# Patient Record
Sex: FEMALE | Race: WHITE | Employment: OTHER | URBAN - NONMETROPOLITAN AREA
[De-identification: names, ages, dates, MRNs, and addresses within clinical notes are randomized per-mention and may not be internally consistent; named-entity substitution may affect disease eponyms.]

---

## 2020-11-11 ENCOUNTER — APPOINTMENT (OUTPATIENT)
Dept: GENERAL RADIOLOGY | Age: 85
DRG: 871 | End: 2020-11-11
Attending: HOSPITALIST
Payer: MEDICARE

## 2020-11-11 ENCOUNTER — OUTSIDE FACILITY SERVICE (OUTPATIENT)
Dept: PULMONOLOGY | Facility: CLINIC | Age: 85
End: 2020-11-11

## 2020-11-11 ENCOUNTER — APPOINTMENT (OUTPATIENT)
Dept: ULTRASOUND IMAGING | Age: 85
DRG: 871 | End: 2020-11-11
Attending: HOSPITALIST
Payer: MEDICARE

## 2020-11-11 ENCOUNTER — HOSPITAL ENCOUNTER (INPATIENT)
Age: 85
LOS: 9 days | Discharge: HOSPICE | DRG: 871 | End: 2020-11-20
Attending: HOSPITALIST | Admitting: HOSPITALIST
Payer: MEDICARE

## 2020-11-11 PROBLEM — J18.9 PNEUMONIA: Status: ACTIVE | Noted: 2020-11-11

## 2020-11-11 LAB
ALBUMIN SERPL-MCNC: 2.8 G/DL (ref 3.5–5.2)
ALP BLD-CCNC: 64 U/L (ref 35–104)
ALT SERPL-CCNC: 10 U/L (ref 5–33)
ANION GAP SERPL CALCULATED.3IONS-SCNC: 15 MMOL/L (ref 7–19)
APTT: 31.5 SEC (ref 26–36.2)
AST SERPL-CCNC: 19 U/L (ref 5–32)
BILIRUB SERPL-MCNC: 0.4 MG/DL (ref 0.2–1.2)
BUN BLDV-MCNC: 11 MG/DL (ref 8–23)
CALCIUM SERPL-MCNC: 9.1 MG/DL (ref 8.8–10.2)
CHLORIDE BLD-SCNC: 97 MMOL/L (ref 98–111)
CO2: 23 MMOL/L (ref 22–29)
CREAT SERPL-MCNC: 0.6 MG/DL (ref 0.5–0.9)
EKG P AXIS: 57 DEGREES
EKG P-R INTERVAL: 126 MS
EKG Q-T INTERVAL: 352 MS
EKG QRS DURATION: 80 MS
EKG QTC CALCULATION (BAZETT): 413 MS
EKG T AXIS: 65 DEGREES
GFR AFRICAN AMERICAN: >59
GFR NON-AFRICAN AMERICAN: >60
GLUCOSE BLD-MCNC: 170 MG/DL (ref 74–109)
GLUCOSE BLD-MCNC: 193 MG/DL (ref 70–99)
GLUCOSE BLD-MCNC: 247 MG/DL (ref 70–99)
GLUCOSE BLD-MCNC: 262 MG/DL (ref 70–99)
HCT VFR BLD CALC: 29.2 % (ref 37–47)
HEMOGLOBIN: 9.4 G/DL (ref 12–16)
INR BLD: 1.25 (ref 0.88–1.18)
LV EF: 60 %
LVEF MODALITY: NORMAL
MAGNESIUM: 1.6 MG/DL (ref 1.6–2.4)
MCH RBC QN AUTO: 28.9 PG (ref 27–31)
MCHC RBC AUTO-ENTMCNC: 32.2 G/DL (ref 33–37)
MCV RBC AUTO: 89.8 FL (ref 81–99)
PDW BLD-RTO: 13.1 % (ref 11.5–14.5)
PERFORMED ON: ABNORMAL
PLATELET # BLD: 383 K/UL (ref 130–400)
PMV BLD AUTO: 10 FL (ref 9.4–12.3)
POTASSIUM SERPL-SCNC: 3.7 MMOL/L (ref 3.5–5)
PRO-BNP: 862 PG/ML (ref 0–1800)
PROTHROMBIN TIME: 15.7 SEC (ref 12–14.6)
RBC # BLD: 3.25 M/UL (ref 4.2–5.4)
SODIUM BLD-SCNC: 135 MMOL/L (ref 136–145)
TOTAL PROTEIN: 6.2 G/DL (ref 6.6–8.7)
TROPONIN: <0.01 NG/ML (ref 0–0.03)
WBC # BLD: 14 K/UL (ref 4.8–10.8)

## 2020-11-11 PROCEDURE — 6360000002 HC RX W HCPCS: Performed by: HOSPITALIST

## 2020-11-11 PROCEDURE — 71045 X-RAY EXAM CHEST 1 VIEW: CPT

## 2020-11-11 PROCEDURE — 85027 COMPLETE CBC AUTOMATED: CPT

## 2020-11-11 PROCEDURE — 82947 ASSAY GLUCOSE BLOOD QUANT: CPT

## 2020-11-11 PROCEDURE — 94640 AIRWAY INHALATION TREATMENT: CPT

## 2020-11-11 PROCEDURE — C1729 CATH, DRAINAGE: HCPCS

## 2020-11-11 PROCEDURE — 87206 SMEAR FLUORESCENT/ACID STAI: CPT

## 2020-11-11 PROCEDURE — 2700000000 HC OXYGEN THERAPY PER DAY

## 2020-11-11 PROCEDURE — 85730 THROMBOPLASTIN TIME PARTIAL: CPT

## 2020-11-11 PROCEDURE — 83615 LACTATE (LD) (LDH) ENZYME: CPT

## 2020-11-11 PROCEDURE — 87150 DNA/RNA AMPLIFIED PROBE: CPT

## 2020-11-11 PROCEDURE — 85610 PROTHROMBIN TIME: CPT

## 2020-11-11 PROCEDURE — 93005 ELECTROCARDIOGRAM TRACING: CPT | Performed by: HOSPITALIST

## 2020-11-11 PROCEDURE — 99222 1ST HOSP IP/OBS MODERATE 55: CPT | Performed by: INTERNAL MEDICINE

## 2020-11-11 PROCEDURE — 89051 BODY FLUID CELL COUNT: CPT

## 2020-11-11 PROCEDURE — 36415 COLL VENOUS BLD VENIPUNCTURE: CPT

## 2020-11-11 PROCEDURE — 87075 CULTR BACTERIA EXCEPT BLOOD: CPT

## 2020-11-11 PROCEDURE — 0W9B3ZZ DRAINAGE OF LEFT PLEURAL CAVITY, PERCUTANEOUS APPROACH: ICD-10-PCS | Performed by: RADIOLOGY

## 2020-11-11 PROCEDURE — 87186 SC STD MICRODIL/AGAR DIL: CPT

## 2020-11-11 PROCEDURE — 87116 MYCOBACTERIA CULTURE: CPT

## 2020-11-11 PROCEDURE — 93306 TTE W/DOPPLER COMPLETE: CPT

## 2020-11-11 PROCEDURE — 83880 ASSAY OF NATRIURETIC PEPTIDE: CPT

## 2020-11-11 PROCEDURE — 87070 CULTURE OTHR SPECIMN AEROBIC: CPT

## 2020-11-11 PROCEDURE — 1210000000 HC MED SURG R&B

## 2020-11-11 PROCEDURE — 87102 FUNGUS ISOLATION CULTURE: CPT

## 2020-11-11 PROCEDURE — 84157 ASSAY OF PROTEIN OTHER: CPT

## 2020-11-11 PROCEDURE — 2580000003 HC RX 258: Performed by: HOSPITALIST

## 2020-11-11 PROCEDURE — 93010 ELECTROCARDIOGRAM REPORT: CPT | Performed by: INTERNAL MEDICINE

## 2020-11-11 PROCEDURE — 86738 MYCOPLASMA ANTIBODY: CPT

## 2020-11-11 PROCEDURE — 80053 COMPREHEN METABOLIC PANEL: CPT

## 2020-11-11 PROCEDURE — 87015 SPECIMEN INFECT AGNT CONCNTJ: CPT

## 2020-11-11 PROCEDURE — 6370000000 HC RX 637 (ALT 250 FOR IP): Performed by: HOSPITALIST

## 2020-11-11 PROCEDURE — 87205 SMEAR GRAM STAIN: CPT

## 2020-11-11 PROCEDURE — 84484 ASSAY OF TROPONIN QUANT: CPT

## 2020-11-11 PROCEDURE — 82945 GLUCOSE OTHER FLUID: CPT

## 2020-11-11 PROCEDURE — B246ZZZ ULTRASONOGRAPHY OF RIGHT AND LEFT HEART: ICD-10-PCS | Performed by: INTERNAL MEDICINE

## 2020-11-11 PROCEDURE — 83735 ASSAY OF MAGNESIUM: CPT

## 2020-11-11 PROCEDURE — 87040 BLOOD CULTURE FOR BACTERIA: CPT

## 2020-11-11 RX ORDER — GLIMEPIRIDE 4 MG/1
4 TABLET ORAL
Status: ON HOLD | COMMUNITY
End: 2020-11-20 | Stop reason: HOSPADM

## 2020-11-11 RX ORDER — MV-MN/OM3/DHA/EPA/FISH/LUT/ZEA 250-5-1 MG
CAPSULE ORAL
Status: ON HOLD | COMMUNITY
End: 2020-11-20 | Stop reason: HOSPADM

## 2020-11-11 RX ORDER — SODIUM CHLORIDE 9 MG/ML
INJECTION, SOLUTION INTRAVENOUS CONTINUOUS
Status: DISCONTINUED | OUTPATIENT
Start: 2020-11-11 | End: 2020-11-20 | Stop reason: HOSPADM

## 2020-11-11 RX ORDER — ACETAMINOPHEN 160 MG
TABLET,DISINTEGRATING ORAL
Status: ON HOLD | COMMUNITY
End: 2020-11-20 | Stop reason: HOSPADM

## 2020-11-11 RX ORDER — ASPIRIN 81 MG/1
81 TABLET ORAL DAILY
Status: ON HOLD | COMMUNITY
End: 2020-11-20 | Stop reason: HOSPADM

## 2020-11-11 RX ORDER — LOVASTATIN 10 MG/1
10 TABLET ORAL NIGHTLY
Status: ON HOLD | COMMUNITY
End: 2020-11-20 | Stop reason: HOSPADM

## 2020-11-11 RX ORDER — SODIUM CHLORIDE 0.9 % (FLUSH) 0.9 %
10 SYRINGE (ML) INJECTION EVERY 12 HOURS SCHEDULED
Status: DISCONTINUED | OUTPATIENT
Start: 2020-11-11 | End: 2020-11-20 | Stop reason: HOSPADM

## 2020-11-11 RX ORDER — ONDANSETRON 2 MG/ML
4 INJECTION INTRAMUSCULAR; INTRAVENOUS EVERY 6 HOURS PRN
Status: DISCONTINUED | OUTPATIENT
Start: 2020-11-11 | End: 2020-11-20 | Stop reason: HOSPADM

## 2020-11-11 RX ORDER — NICOTINE POLACRILEX 4 MG
15 LOZENGE BUCCAL PRN
Status: DISCONTINUED | OUTPATIENT
Start: 2020-11-11 | End: 2020-11-20 | Stop reason: HOSPADM

## 2020-11-11 RX ORDER — FAMOTIDINE 20 MG/1
20 TABLET, FILM COATED ORAL 2 TIMES DAILY
Status: DISCONTINUED | OUTPATIENT
Start: 2020-11-11 | End: 2020-11-11

## 2020-11-11 RX ORDER — SODIUM CHLORIDE 0.9 % (FLUSH) 0.9 %
10 SYRINGE (ML) INJECTION PRN
Status: DISCONTINUED | OUTPATIENT
Start: 2020-11-11 | End: 2020-11-20 | Stop reason: HOSPADM

## 2020-11-11 RX ORDER — SODIUM CHLORIDE 0.9 % (FLUSH) 0.9 %
10 SYRINGE (ML) INJECTION EVERY 12 HOURS SCHEDULED
Status: DISCONTINUED | OUTPATIENT
Start: 2020-11-11 | End: 2020-11-11 | Stop reason: SDUPTHER

## 2020-11-11 RX ORDER — LISINOPRIL 10 MG/1
10 TABLET ORAL DAILY
Status: ON HOLD | COMMUNITY
End: 2020-11-20 | Stop reason: HOSPADM

## 2020-11-11 RX ORDER — SODIUM CHLORIDE 0.9 % (FLUSH) 0.9 %
10 SYRINGE (ML) INJECTION PRN
Status: DISCONTINUED | OUTPATIENT
Start: 2020-11-11 | End: 2020-11-11 | Stop reason: SDUPTHER

## 2020-11-11 RX ORDER — PROMETHAZINE HYDROCHLORIDE 12.5 MG/1
12.5 TABLET ORAL EVERY 6 HOURS PRN
Status: DISCONTINUED | OUTPATIENT
Start: 2020-11-11 | End: 2020-11-20 | Stop reason: HOSPADM

## 2020-11-11 RX ORDER — POLYETHYLENE GLYCOL 3350 17 G/17G
17 POWDER, FOR SOLUTION ORAL DAILY PRN
Status: DISCONTINUED | OUTPATIENT
Start: 2020-11-11 | End: 2020-11-20 | Stop reason: HOSPADM

## 2020-11-11 RX ORDER — PROMETHAZINE HYDROCHLORIDE 12.5 MG/1
12.5 TABLET ORAL EVERY 6 HOURS PRN
Status: DISCONTINUED | OUTPATIENT
Start: 2020-11-11 | End: 2020-11-11

## 2020-11-11 RX ORDER — IPRATROPIUM BROMIDE AND ALBUTEROL SULFATE 2.5; .5 MG/3ML; MG/3ML
1 SOLUTION RESPIRATORY (INHALATION)
Status: DISCONTINUED | OUTPATIENT
Start: 2020-11-11 | End: 2020-11-20 | Stop reason: HOSPADM

## 2020-11-11 RX ORDER — DEXTROSE MONOHYDRATE 50 MG/ML
100 INJECTION, SOLUTION INTRAVENOUS PRN
Status: DISCONTINUED | OUTPATIENT
Start: 2020-11-11 | End: 2020-11-20 | Stop reason: HOSPADM

## 2020-11-11 RX ORDER — ACETAMINOPHEN 325 MG/1
650 TABLET ORAL EVERY 6 HOURS PRN
Status: DISCONTINUED | OUTPATIENT
Start: 2020-11-11 | End: 2020-11-11 | Stop reason: SDUPTHER

## 2020-11-11 RX ORDER — POLYETHYLENE GLYCOL 3350 17 G/17G
17 POWDER, FOR SOLUTION ORAL DAILY PRN
Status: DISCONTINUED | OUTPATIENT
Start: 2020-11-11 | End: 2020-11-11

## 2020-11-11 RX ORDER — ACETAMINOPHEN 650 MG/1
650 SUPPOSITORY RECTAL EVERY 6 HOURS PRN
Status: DISCONTINUED | OUTPATIENT
Start: 2020-11-11 | End: 2020-11-20 | Stop reason: HOSPADM

## 2020-11-11 RX ORDER — ACETAMINOPHEN 325 MG/1
650 TABLET ORAL EVERY 6 HOURS PRN
Status: DISCONTINUED | OUTPATIENT
Start: 2020-11-11 | End: 2020-11-20 | Stop reason: HOSPADM

## 2020-11-11 RX ORDER — LATANOPROST 50 UG/ML
1 SOLUTION/ DROPS OPHTHALMIC NIGHTLY
Status: ON HOLD | COMMUNITY
End: 2020-11-20 | Stop reason: HOSPADM

## 2020-11-11 RX ORDER — LEVOFLOXACIN 5 MG/ML
750 INJECTION, SOLUTION INTRAVENOUS EVERY 24 HOURS
Status: DISCONTINUED | OUTPATIENT
Start: 2020-11-11 | End: 2020-11-14

## 2020-11-11 RX ORDER — ONDANSETRON 2 MG/ML
4 INJECTION INTRAMUSCULAR; INTRAVENOUS EVERY 6 HOURS PRN
Status: DISCONTINUED | OUTPATIENT
Start: 2020-11-11 | End: 2020-11-11

## 2020-11-11 RX ORDER — DEXTROSE MONOHYDRATE 25 G/50ML
12.5 INJECTION, SOLUTION INTRAVENOUS PRN
Status: DISCONTINUED | OUTPATIENT
Start: 2020-11-11 | End: 2020-11-20 | Stop reason: HOSPADM

## 2020-11-11 RX ORDER — ACETAMINOPHEN 650 MG/1
650 SUPPOSITORY RECTAL EVERY 6 HOURS PRN
Status: DISCONTINUED | OUTPATIENT
Start: 2020-11-11 | End: 2020-11-11 | Stop reason: SDUPTHER

## 2020-11-11 RX ADMIN — SODIUM CHLORIDE: 9 INJECTION, SOLUTION INTRAVENOUS at 08:00

## 2020-11-11 RX ADMIN — ACETAMINOPHEN 650 MG: 325 TABLET ORAL at 19:48

## 2020-11-11 RX ADMIN — PIPERACILLIN AND TAZOBACTAM 3.38 G: 3; .375 INJECTION, POWDER, LYOPHILIZED, FOR SOLUTION INTRAVENOUS at 10:35

## 2020-11-11 RX ADMIN — IPRATROPIUM BROMIDE AND ALBUTEROL SULFATE 1 AMPULE: .5; 3 SOLUTION RESPIRATORY (INHALATION) at 11:33

## 2020-11-11 RX ADMIN — VANCOMYCIN HYDROCHLORIDE 750 MG: 1 INJECTION, POWDER, LYOPHILIZED, FOR SOLUTION INTRAVENOUS at 11:50

## 2020-11-11 RX ADMIN — VANCOMYCIN HYDROCHLORIDE 1000 MG: 10 INJECTION, POWDER, LYOPHILIZED, FOR SOLUTION INTRAVENOUS at 11:25

## 2020-11-11 RX ADMIN — IPRATROPIUM BROMIDE AND ALBUTEROL SULFATE 1 AMPULE: .5; 3 SOLUTION RESPIRATORY (INHALATION) at 07:39

## 2020-11-11 RX ADMIN — PIPERACILLIN AND TAZOBACTAM 3.38 G: 3; .375 INJECTION, POWDER, LYOPHILIZED, FOR SOLUTION INTRAVENOUS at 19:48

## 2020-11-11 RX ADMIN — LEVOFLOXACIN 750 MG: 5 INJECTION, SOLUTION INTRAVENOUS at 08:00

## 2020-11-11 SDOH — HEALTH STABILITY: MENTAL HEALTH: HOW OFTEN DO YOU HAVE A DRINK CONTAINING ALCOHOL?: NEVER

## 2020-11-11 ASSESSMENT — PAIN SCALES - GENERAL: PAINLEVEL_OUTOF10: 7

## 2020-11-11 NOTE — PROGRESS NOTES
4 Eyes Skin Assessment    Neil Choi is being assessed upon: Admission    I agree that I, Ashley Kehr, along with Cale Peguero RN (either 2 RN's or 1 LPN and 1 RN) have performed a thorough Head to Toe Skin Assessment on the patient. ALL assessment sites listed below have been assessed. Areas assessed by both nurses:     [x]   Head, Face, and Ears   [x]   Shoulders, Back, and Chest  [x]   Arms, Elbows, and Hands   [x]   Coccyx, Sacrum, and Ischium  [x]   Legs, Feet, and Heels    Does the Patient have Skin Breakdown?  No    Mark Prevention initiated: No  Wound Care Orders initiated: No    WOC nurse consulted for Pressure Injury (Stage 3,4, Unstageable, DTI, NWPT, and Complex wounds) and New or Established Ostomies: No        Primary Nurse eSignature: Ashley Kehr, RN on 11/11/2020 at 6:00 AM      Co-Signer eSignature: Electronically signed by Robert Klein RN on 11/11/20 at 6:40 AM CST

## 2020-11-11 NOTE — PROGRESS NOTES
Pharmacy Note  Vancomycin Consult    Panchito Perdomo is a 80 y.o. female started on Vancomycin for pneumonia; consult received from Dr. Meena Alex to manage therapy. Also receiving the following antibiotics: piperacillin-tazobactam, Levofloxacin. Active Problems:    Pneumonia  Resolved Problems:    * No resolved hospital problems. *      Allergies:  Erythromycin; Niacin and related; Penicillins; and Sulfa antibiotics     Temp max: 98.2    Recent Labs     11/11/20  0741   BUN 11       Recent Labs     11/11/20  0741   CREATININE 0.6       Recent Labs     11/11/20  0741   WBC 14.0*       No intake or output data in the 24 hours ending 11/11/20 1318    Culture Date Source Results   11/11/20 Blood Sent       Ht Readings from Last 1 Encounters:   11/11/20 5' 7\" (1.702 m)        Wt Readings from Last 1 Encounters:   11/11/20 122 lb 9 oz (55.6 kg)         Body mass index is 19.2 kg/m². Estimated Creatinine Clearance: 56 mL/min (based on SCr of 0.6 mg/dL). Assessment/Plan:  Will initiate vancomycin 1000 mg IV every 24 hours. Timing of trough level will be determined based on culture results, renal function, and clinical response. Thank you for the consult. Will continue to follow.     Electronically signed by Jeremy Bauer, [facility] on 11/11/2020 at 1:18 PM

## 2020-11-11 NOTE — PROGRESS NOTES
Speech Language Pathology   Facility/Department: [facility] 3 SHELLY/VAS/MED    NAME: Ekaterina Flores  : 1931  MRN: [id number]     Received order to assess. Patient currently NPO for thoracentesis.  Will complete evaluation when cleared for PO intake by MD.    Electronically signed by MALISSA Heath on 2020 at 12:58 PM

## 2020-11-11 NOTE — SIGNIFICANT EVENT
Patient seen and examined. States she is feeling well, denies any complaints. Thoracentesis pending. Reviewed records from OSH and updated allergies (Patient does not know what she is allergic to). Penicillin is listed as allergy but she tolerated piperacillin/tazobactam without issue.

## 2020-11-11 NOTE — H&P
History and Physical    Patient Name:  Arleth Burnner    :  1931    Chief Complaint:   Large plural effusion     History of Present Illness:   Arleth Brunner presents to [facility] as transfer with large left plural effusion, possible PNA. Started on Azithromycin and Rocephin. ER was not able to drain the effusion. US guided thoracentesis is ordered for today am. Pulmonary service consulted. Labs pending    Pt is demented and not answering any questions    Past Medical History:   has a past medical history of Blood circulation, collateral, Cerebral artery occlusion with cerebral infarction ([facility] [address] Diabetes mellitus ([facility] [address] Hyperlipidemia, and Hypertension. Surgical History:   has a past surgical history that includes Cholecystectomy. Social History:   reports that she does not drink alcohol. Family History:  unknown    Medications:  Prior to Admission medications    Medication Sig Start Date End Date Taking? Authorizing Provider   aspirin EC 81 MG EC tablet Take 81 mg by mouth daily   Yes Historical Provider, MD   Multiple Vitamins-Minerals ([address] 50+) CAPS Take by mouth   Yes Historical Provider, MD   Cholecalciferol (VITAMIN D3) 50 MCG ( UT) CAPS Take by mouth   Yes Historical Provider, MD   glimepiride (AMARYL) 4 MG tablet Take 4 mg by mouth every morning (before breakfast)   Yes Historical Provider, MD   latanoprost (XALATAN) 0.005 % ophthalmic solution 1 drop nightly   Yes Historical Provider, MD   lisinopril (PRINIVIL;ZESTRIL) 10 MG tablet Take 10 mg by mouth daily   Yes Historical Provider, MD   lovastatin (MEVACOR) 10 MG tablet Take 10 mg by mouth nightly   Yes Historical Provider, MD   metFORMIN (GLUCOPHAGE) 1000 MG tablet Take 1,000 mg by mouth 2 times daily (with meals)   Yes Historical Provider, MD       Allergies:  Patient has no allergy information on record.      Review of Systems:   Pt demented, not answering any questions    Physical Examination:    Vital Signs: BP (!) 142/68   Pulse 99   Temp 98.2 °F (36.8 °C) (Temporal)   Resp 16   Wt 122 lb 9 oz (55.6 kg)   SpO2 94%   General appearance: Well preserved, mesomorphic body habitus, confused, moderate distress. Skin: Skin color, texture, turgor normal. No rashes or lesions. No induration or tightening palpated. Head: Normocephalic. No masses, lesions, tenderness or abnormalities  Eyes: conjunctivae/corneas clear. EOM's intact. Sclera non icteric. Ears: External ears normal.  Nose/Sinuses: Nares normal. Septum midline. Mucosa normal. No drainage or sinus tenderness. Oropharynx: Lips, mucosa, and tongue normal. Oropharynx clear with no exudate seen. Neck: Neck supple, and symmetric. No adenopathy. Trachea is midline. Carotids brisk in upstroke without bruits, No abnormal JVP noted at 45°. Lungs: decreased breath sounds BL  Heart:  S1 > S2. Regular rate and rhythm. No gallop, murmur, rub, palpable thrill or heave noted. Abdomen: Abdomen soft, non-tender. BS normal. No masses, organomegaly. No hernia noted. Extremities: Extremities normal. No deformities, edema, or skin discoloration. No cyanosis or clubbing noted to the nails. Peripheral pulses 4/4. Musculoskeletal: Spine ROM normal. Muscular strength intact. Neuro: does not follow any commends. Pertinent Labs:  CBC: No results for input(s): WBC, RBC, HGB, HCT, MCV, MCH, MCHC, RDW, PLT, MPV in the last 72 hours. BMP: No results for input(s): NA, K, CL, CO2, BUN, CREATININE, GLUCOSE, CALCIUM in the last 72 hours. ABGs: No results for input(s): PO2, PCO2, PH, HCO3, BE, O2SAT in the last 72 hours. INR: No results for input(s): INR, PROTIME in the last 72 hours.   BNP:  No results found for: BNP  TSH: No results found for: TSH  Cardiac Injury Profile: No results found for: CKTOTAL, CKMB, CKMBINDEX, TROPONINI  Lipid Profile: No components found for: CHLPL  No results found for: TRIG  No results found for: HDL  No results found for: LDLCALC  No results found for: LABVLDL  Hemoglobin A1C: No results found for: LABA1C  No results found for: EAG      Assessment/Plan:  · Pleural effusion - thoracentesis today- pulmonary consult  · Possible PNA- blood cultures, ab  · DM2- iss                   I have reviewed my findings and recommendations in detail with Sera Meyers.     Pamela Jolly MD       Admission level 2

## 2020-11-12 ENCOUNTER — OUTSIDE FACILITY SERVICE (OUTPATIENT)
Dept: PULMONOLOGY | Facility: CLINIC | Age: 85
End: 2020-11-12

## 2020-11-12 PROBLEM — Z51.5 PALLIATIVE CARE PATIENT: Status: ACTIVE | Noted: 2020-11-12

## 2020-11-12 PROBLEM — D64.9 NORMOCYTIC ANEMIA: Status: ACTIVE | Noted: 2020-11-12

## 2020-11-12 PROBLEM — E11.9 TYPE 2 DIABETES MELLITUS (HCC): Status: ACTIVE | Noted: 2020-11-12

## 2020-11-12 PROBLEM — I69.30 HISTORY OF STROKE WITH RESIDUAL DEFICIT: Status: ACTIVE | Noted: 2020-11-12

## 2020-11-12 PROBLEM — F03.90 DEMENTIA (HCC): Status: ACTIVE | Noted: 2020-11-12

## 2020-11-12 PROBLEM — J90 PLEURAL EFFUSION: Status: ACTIVE | Noted: 2020-11-12

## 2020-11-12 PROBLEM — I10 ESSENTIAL HYPERTENSION: Status: ACTIVE | Noted: 2020-11-12

## 2020-11-12 PROBLEM — A41.9 SEPSIS (HCC): Status: ACTIVE | Noted: 2020-11-12

## 2020-11-12 PROBLEM — E83.51 HYPOCALCEMIA: Status: ACTIVE | Noted: 2020-11-12

## 2020-11-12 PROBLEM — E87.1 HYPONATREMIA: Status: ACTIVE | Noted: 2020-11-12

## 2020-11-12 LAB
ACINETOBACTER BAUMANNII BY PCR: NOT DETECTED
ANION GAP SERPL CALCULATED.3IONS-SCNC: 10 MMOL/L (ref 7–19)
APPEARANCE FLUID: NORMAL
BUN BLDV-MCNC: 10 MG/DL (ref 8–23)
CALCIUM SERPL-MCNC: 8.6 MG/DL (ref 8.8–10.2)
CANDIDA ALBICANS BY PCR: NOT DETECTED
CANDIDA GLABRATA BY PCR: NOT DETECTED
CANDIDA KRUSEI BY PCR: NOT DETECTED
CANDIDA PARAPSILOSIS BY PCR: NOT DETECTED
CANDIDA TROPICALIS BY PCR: NOT DETECTED
CELL COUNT FLUID TYPE: NORMAL
CHLORIDE BLD-SCNC: 99 MMOL/L (ref 98–111)
CLOT EVALUATION: NORMAL
CO2: 24 MMOL/L (ref 22–29)
COLOR FLUID: NORMAL
CREAT SERPL-MCNC: 0.5 MG/DL (ref 0.5–0.9)
ENTEROBACTER CLOACAE COMPLEX BY PCR: NOT DETECTED
ENTEROBACTERALES BY PCR: NOT DETECTED
ENTEROCOCCUS BY PCR: NOT DETECTED
ESCHERICHIA COLI BY PCR: NOT DETECTED
FLUID TYPE: NORMAL
GFR AFRICAN AMERICAN: >59
GFR NON-AFRICAN AMERICAN: >60
GLUCOSE BLD-MCNC: 157 MG/DL (ref 74–109)
GLUCOSE BLD-MCNC: 263 MG/DL (ref 70–99)
GLUCOSE BLD-MCNC: 291 MG/DL (ref 70–99)
GLUCOSE BLD-MCNC: 402 MG/DL (ref 70–99)
GLUCOSE, FLUID: 191
HAEMOPHILUS INFLUENZAE BY PCR: NOT DETECTED
HCT VFR BLD CALC: 27.8 % (ref 37–47)
HEMOGLOBIN: 9 G/DL (ref 12–16)
KLEBSIELLA OXYTOCA BY PCR: NOT DETECTED
KLEBSIELLA PNEUMONIAE GROUP BY PCR: NOT DETECTED
LD, FLUID: 766 U/L
LISTERIA MONOCYTOGENES BY PCR: NOT DETECTED
LYMPHOCYTES, BODY FLUID: 94 %
MACROPHAGE FLUID: 3 %
MCH RBC QN AUTO: 28.7 PG (ref 27–31)
MCHC RBC AUTO-ENTMCNC: 32.4 G/DL (ref 33–37)
MCV RBC AUTO: 88.5 FL (ref 81–99)
METHICILLIN RESISTANCE MECA/C  BY PCR: NOT DETECTED
MONOCYTE, FLUID: 2 %
NEISSERIA MENINGITIDIS BY PCR: NOT DETECTED
NEUTROPHIL, FLUID: 1 %
NUCLEATED CELLS FLUID: 877 /CUMM
NUMBER OF CELLS COUNTED FLUID: 100
PDW BLD-RTO: 13.2 % (ref 11.5–14.5)
PERFORMED ON: ABNORMAL
PLATELET # BLD: 357 K/UL (ref 130–400)
PMV BLD AUTO: 9.8 FL (ref 9.4–12.3)
POTASSIUM REFLEX MAGNESIUM: 3.9 MMOL/L (ref 3.5–5)
PROTEIN FLUID: 3.5 G/DL
PROTEUS BY PCR: NOT DETECTED
PSEUDOMONAS AERUGINOSA BY PCR: NOT DETECTED
RBC # BLD: 3.14 M/UL (ref 4.2–5.4)
RBC FLUID: NORMAL /CUMM
SARS-COV-2, NAAT: NOT DETECTED
SERRATIA MARCESCENS BY PCR: NOT DETECTED
SODIUM BLD-SCNC: 133 MMOL/L (ref 136–145)
STAPHYLOCOCCUS AUREUS BY PCR: NOT DETECTED
STAPHYLOCOCCUS SPECIES BY PCR: DETECTED
STREPTOCOCCUS AGALACTIAE BY PCR: NOT DETECTED
STREPTOCOCCUS PNEUMONIAE BY PCR: NOT DETECTED
STREPTOCOCCUS PYOGENES  BY PCR: NOT DETECTED
STREPTOCOCCUS SPECIES BY PCR: NOT DETECTED
WBC # BLD: 12.8 K/UL (ref 4.8–10.8)

## 2020-11-12 PROCEDURE — 92610 EVALUATE SWALLOWING FUNCTION: CPT

## 2020-11-12 PROCEDURE — 6370000000 HC RX 637 (ALT 250 FOR IP): Performed by: HOSPITALIST

## 2020-11-12 PROCEDURE — 2580000003 HC RX 258: Performed by: HOSPITALIST

## 2020-11-12 PROCEDURE — 92523 SPEECH SOUND LANG COMPREHEN: CPT

## 2020-11-12 PROCEDURE — 1210000000 HC MED SURG R&B

## 2020-11-12 PROCEDURE — 99232 SBSQ HOSP IP/OBS MODERATE 35: CPT | Performed by: INTERNAL MEDICINE

## 2020-11-12 PROCEDURE — 36415 COLL VENOUS BLD VENIPUNCTURE: CPT

## 2020-11-12 PROCEDURE — 6360000002 HC RX W HCPCS: Performed by: HOSPITALIST

## 2020-11-12 PROCEDURE — 80048 BASIC METABOLIC PNL TOTAL CA: CPT

## 2020-11-12 PROCEDURE — 82947 ASSAY GLUCOSE BLOOD QUANT: CPT

## 2020-11-12 PROCEDURE — 88305 TISSUE EXAM BY PATHOLOGIST: CPT

## 2020-11-12 PROCEDURE — 88112 CYTOPATH CELL ENHANCE TECH: CPT

## 2020-11-12 PROCEDURE — 94640 AIRWAY INHALATION TREATMENT: CPT

## 2020-11-12 PROCEDURE — U0002 COVID-19 LAB TEST NON-CDC: HCPCS

## 2020-11-12 PROCEDURE — 85027 COMPLETE CBC AUTOMATED: CPT

## 2020-11-12 PROCEDURE — 2700000000 HC OXYGEN THERAPY PER DAY

## 2020-11-12 RX ADMIN — PIPERACILLIN AND TAZOBACTAM 3.38 G: 3; .375 INJECTION, POWDER, LYOPHILIZED, FOR SOLUTION INTRAVENOUS at 20:13

## 2020-11-12 RX ADMIN — Medication 1000 MG: at 10:13

## 2020-11-12 RX ADMIN — Medication 10 ML: at 01:16

## 2020-11-12 RX ADMIN — PIPERACILLIN AND TAZOBACTAM 3.38 G: 3; .375 INJECTION, POWDER, LYOPHILIZED, FOR SOLUTION INTRAVENOUS at 11:43

## 2020-11-12 RX ADMIN — LEVOFLOXACIN 750 MG: 5 INJECTION, SOLUTION INTRAVENOUS at 08:33

## 2020-11-12 RX ADMIN — INSULIN LISPRO 6 UNITS: 100 INJECTION, SOLUTION INTRAVENOUS; SUBCUTANEOUS at 16:55

## 2020-11-12 RX ADMIN — INSULIN LISPRO 2 UNITS: 100 INJECTION, SOLUTION INTRAVENOUS; SUBCUTANEOUS at 23:01

## 2020-11-12 RX ADMIN — INSULIN LISPRO 3 UNITS: 100 INJECTION, SOLUTION INTRAVENOUS; SUBCUTANEOUS at 12:59

## 2020-11-12 RX ADMIN — Medication 10 ML: at 09:07

## 2020-11-12 RX ADMIN — IPRATROPIUM BROMIDE AND ALBUTEROL SULFATE 1 AMPULE: .5; 3 SOLUTION RESPIRATORY (INHALATION) at 10:34

## 2020-11-12 RX ADMIN — IPRATROPIUM BROMIDE AND ALBUTEROL SULFATE 1 AMPULE: .5; 3 SOLUTION RESPIRATORY (INHALATION) at 20:41

## 2020-11-12 RX ADMIN — ONDANSETRON 4 MG: 2 INJECTION INTRAMUSCULAR; INTRAVENOUS at 10:53

## 2020-11-12 RX ADMIN — IPRATROPIUM BROMIDE AND ALBUTEROL SULFATE 1 AMPULE: .5; 3 SOLUTION RESPIRATORY (INHALATION) at 07:04

## 2020-11-12 RX ADMIN — PIPERACILLIN AND TAZOBACTAM 3.38 G: 3; .375 INJECTION, POWDER, LYOPHILIZED, FOR SOLUTION INTRAVENOUS at 04:47

## 2020-11-12 NOTE — PROGRESS NOTES
Hospitalist Progress Note  11/12/2020 3:29 PM  Subjective:   Admit Date: 11/11/2020  PCP: Milan Carl    Chief Complaint: shortness of breath, pleural effusion    Subjective: Patient remains quite confused this morning though she has some recollection of undergoing thoracentesis yesterday. She seems able to breath a little more easily today. No bleeding or chest pain. History is otherwise unchanged. Cumulative Hospital History:   11-11: Presented overnight from OSH ED where she was taken with shortness of breath. Large pleural effusion on left discovered and they were unable to perform thoracentesis in ED and thus was transferred for this and for pulmonology consult. Patient demented, unable to provide history. Admitted to med/surg on levofloxacin, piperacillin/tazobactam, and vancomycin. Underwent US-guided thoracentesis. Pulmonology consulted. 11-12: Still very confused but seems to be breathing more easily now. Will need to contact family to determine her baseline mental status. ROS: 14 point review of systems is negative except as specifically addressed above. DIET DYSPHAGIA PUREED;  Moderately Thick (Honey)    Intake/Output Summary (Last 24 hours) at 11/12/2020 1529  Last data filed at 11/12/2020 1428  Gross per 24 hour   Intake 480 ml   Output --   Net 480 ml     Medications:   sodium chloride 75 mL/hr at 11/11/20 0800    dextrose       Current Facility-Administered Medications   Medication Dose Route Frequency Provider Last Rate Last Dose    0.9 % sodium chloride infusion   Intravenous Continuous Katiana Membreno MD 75 mL/hr at 11/11/20 0800      sodium chloride flush 0.9 % injection 10 mL  10 mL Intravenous 2 times per day Katiana Membreno MD   10 mL at 11/12/20 0907    sodium chloride flush 0.9 % injection 10 mL  10 mL Intravenous PRN Katiana Membreno MD        acetaminophen (TYLENOL) tablet 650 mg  650 mg Oral Q6H PRN Katiana Membreno MD   650 mg at 11/11/20 1948 Or    acetaminophen (TYLENOL) suppository 650 mg  650 mg Rectal Q6H PRN Tyrone Anderson MD        polyethylene glycol [facility]) packet 17 g  17 g Oral Daily PRN Tyrone Anderson MD        promethazine (PHENERGAN) tablet 12.5 mg  12.5 mg Oral Q6H PRN Tyrone Anderson MD        Or    ondansetron [facility]) injection 4 mg  4 mg Intravenous Q6H PRN Tyrone Anderson MD   4 mg at 11/12/20 1053    ipratropium-albuterol (DUONEB) nebulizer solution 1 ampule  1 ampule Inhalation Q4H WA Tyrone Anderson MD   1 ampule at 11/12/20 1034    piperacillin-tazobactam (ZOSYN) 3.375 g in dextrose 5 % 50 mL IVPB extended infusion (mini-bag)  3.375 g Intravenous Q8H Luca Toro MD 12.5 mL/hr at 11/12/20 1143 3.375 g at 11/12/20 1143    And    levoFLOXacin (LEVAQUIN) 750 MG/150ML infusion 750 mg  750 mg Intravenous Q24H Tyrone Anderson MD   Stopped at 11/12/20 1003    insulin lispro (HUMALOG) injection vial 0-6 Units  0-6 Units Subcutaneous TID WC Tyrone Anderson MD   3 Units at 11/12/20 1259    insulin lispro (HUMALOG) injection vial 0-3 Units  0-3 Units Subcutaneous Nightly Tyrone Anderson MD        glucose (GLUTOSE) 40 % oral gel 15 g  15 g Oral PRN Tyrone Anderson MD        dextrose 50 % IV solution  12.5 g Intravenous PRN Tyrone Anderson MD        glucagon (rDNA) injection 1 mg  1 mg Intramuscular PRN Tyrone Anderson MD        dextrose 5 % solution  100 mL/hr Intravenous PRN Tyrone Anderson MD        vancomycin Eileen Cortes) intermittent dosing (placeholder)   Other RX Placeholder Tyrone Anderson MD        vancomycin (VANCOCIN) 1 g in dextrose 5% 250 mL IVPB  1,000 mg Intravenous Q24H Tyrone Anderson MD   Stopped at 11/12/20 1113        Labs:     Recent Labs     11/11/20  0741 11/12/20  0316   WBC 14.0* 12.8*   RBC 3.25* 3.14*   HGB 9.4* 9.0*   HCT 29.2* 27.8*   MCV 89.8 88.5   MCH 28.9 28.7   MCHC 32.2* 32.4*    357     Recent Labs     11/11/20  0741 11/12/20  0316   * 133*   K 3.7 3.9   ANIONGAP 15 10   CL 97* 99   CO2 23 24   BUN 11 10   CREATININE 0.6 0.5   GLUCOSE 170* 157*   CALCIUM 9.1 8.6*     Recent Labs     11/11/20  0741   MG 1.6     Recent Labs     11/11/20  0741   AST 19   ALT 10   BILITOT 0.4   ALKPHOS 64     ABGs:No results for input(s): PH, PO2, PCO2, HCO3, BE, O2SAT in the last 72 hours. Troponin T:   Recent Labs     11/11/20 0741   TROPONINI <0.01     INR:   Recent Labs     11/11/20 0741   INR 1.25*     Lactic Acid: No results for input(s): LACTA in the last 72 hours.     Objective:   Vitals: /71   Pulse 77   Temp 97.9 °F (36.6 °C)   Resp 18   Ht 5' 7\" (1.702 m)   Wt 124 lb 2 oz (56.3 kg)   SpO2 95%   BMI 19.44 kg/m²   24HR INTAKE/OUTPUT:      Intake/Output Summary (Last 24 hours) at 11/12/2020 1529  Last data filed at 11/12/2020 1428  Gross per 24 hour   Intake 480 ml   Output --   Net 480 ml     General appearance: alert and cooperative with exam  HEENT: atraumatic, eyes with clear conjunctiva and normal lids, pupils and irises normal, external ears and nose are normal, lips normal  Neck without masses, lympadenopathy, bruit, thyroid normal  Lungs: no increased work of breathing, diminished breath sounds LLL  Heart: regular rate and rhythm, S1, S2 normal, no murmur, click, rub or gallop  Abdomen: soft, non-tender; bowel sounds normal; no masses,  no organomegaly  Extremities: extremities normal, atraumatic, no cyanosis or edema  Neurologic: normal sensation, alert but very confused, has memorized the year, flattened affect  Skin: no rashes, nodules    Assessment and Plan:   Principal Problem:    Sepsis ([facility] [address]  Active Problems:    Pneumonia    Palliative care patient    Pleural effusion    Hyponatremia    Normocytic anemia    Type 2 diabetes mellitus ([facility] [address]    History of stroke with residual deficit    Dementia (HCC)    Hypocalcemia    Essential

## 2020-11-12 NOTE — PROGRESS NOTES
Pulmonary and Critical Care Progress Note [address] [facility]    Patient: Renan Lucero  2/17/1931   MR# [id number]   Acct# [de-identified]  11/12/20   9:28 AM  Referring Provider: Sesar Bergman DO    Chief Complaint: Large pleural effusion    Interval history: Patient has no specific complaints. She is very hard of hearing. She underwent thoracentesis yesterday with 1000 mL of fluid removed. She reports no change in her breathing. She coughs up thick mucus. Family member at the bedside indicates this is a chronic problem for her. She also has chronic swallowing issues. She is n.p.o. awaiting speech evaluation. She is receiving IV fluids at 75 mL's an hour. Sodium 133. Medications: sodium chloride flush, 10 mL, Intravenous, 2 times per day    ipratropium-albuterol, 1 ampule, Inhalation, Q4H WA    piperacillin-tazobactam, 3.375 g, Intravenous, Q8H **AND** levofloxacin, 750 mg, Intravenous, Q24H    insulin lispro, 0-6 Units, Subcutaneous, TID WC    insulin lispro, 0-3 Units, Subcutaneous, Nightly    vancomycin (VANCOCIN) intermittent dosing (placeholder), , Other, RX Placeholder    vancomycin, 1,000 mg, Intravenous, Q24H   Review of Systems: Review of Systems   Unable to perform ROS: Dementia     Physical Exam:  Blood pressure (!) 147/66, pulse 89, temperature 97.6 °F (36.4 °C), resp. rate 16, height 5' 7\" (1.702 m), weight 124 lb 2 oz (56.3 kg), SpO2 93 %. Intake/Output Summary (Last 24 hours) at 11/12/2020 6340  Last data filed at 11/11/2020 1909  Gross per 24 hour   Intake 0 ml   Output --   Net 0 ml     Physical Exam:    Elderly,  female lying in the bed, awake, alert and pleasantly disoriented, dementia at baseline  HEENT: Atraumatic, normocephalic, pupil equal reactive light and accommodation. Very hard of hearing  Neck: Supple, no mass, no jugular distention, no lymphadenopathy, no thyroid swelling. Heart: Sounds normal, irregular, rate normal, no murmur. No gallop.     Lungs: the right lung, 11-12-20    Pulmonary Assessment:    1. Acute hypoxic respiratory failure, stable to improved  2. Large left-sided pleural effusion  3. Status post ultrasound-guided thoracentesis  4. Possible heart failure versus parapneumonic effusion  5. Community-acquired pneumonia  6. Hypertension  7. Hyperlipidemia  8. Diabetes mellitus  9. History of stroke  10. Dysphagia    Recommend:     · Supplemental O2 if needed for saturation above 90, wean down as tolerated  · We will add Robitussin for thick secretions once cleared to do so by speech  · Awaiting speech evaluation to altered diet, known swallowing difficulties per family  · Unfortunately pleural fluid testing was not ordered yesterday when pleural fluid was sent. I have placed orders however pH, cell count and some other studies will not be accurate at this point. Awaiting results  · Continue empiric antibiotics  · Bronchodilators  · Incentive spirometry  · Will need SCDs for DVT prophylaxis if she cannot mobilize  · DNR status    Electronically signed by Janeen Drake on 11/12/2020 at 9:28 AM    Physician Substantive portion: It was seen in the follow-up visit in pulmonary rounds in medical floor with [address] [place] [address]. I concur with her clinical assessment and documentation and I also examined the patient. Patient was seen by me yesterday for large left-sided pleural effusion and she had a thoracentesis done by radiology and 1 L pleural fluid out. Apparently pleural fluid has not been sent for testing and test has been ordered later and will follow the test results. Patient is currently on 2 L of oxygen and resting comfortably she has dementia and hearing impairment and could not provide much history but appears comfortable. Patient's family is concerned about her home status with the son and she may need to get an skilled nursing facility or nursing home placement. No other acute events overnight.     On physical examination patient is a elderly  female comfortable. HEENT: Traumatic normocephalic. Pupil equal at light and accommodation. Neck: Supple no mass no jugular venous distention. Heart: Sounds normal rate and rhythm regular no gallop or murmur. Lungs: Improved breath sounds with a few crackles no wheezes. Abdomen: Soft nontender bowel sounds present no organomegaly extremities: No edema normal pulses normal color. Neurologic: Mostly intact. Skin: No breakdown. Psych: Could not be evaluated. Continue supportive respiratory care with oxygen and titrate to keep oxygen more than 1 to 2%. Continue Robitussin to clear respiratory secretions. Wait for pleural fluid analysis results. Continue bronchodilators and empiric antibiotics. Encourage incentive spirometry. Fall precautions. PT OT. DVT prophylaxis and ulcer prophylaxis. Patient is DNR. Continue current treatment plan and placement issues is being addressed. We will continue following her and make further recommendations. I have seen and examined patient personally, performing a face-to-face diagnostic evaluation with plan of care reviewed and developed with APRN and nursing staff. I have addended and/or modified the above history of present illness, physical examination, and assessment and plan to reflect my findings and impressions. Essential elements of the care plan were discussed with APRN above. Agree with findings and assessment/plan as documented above.     Electronically signed by   Evangelist Gaspar MD   on 11/12/2020, 8:50 PM

## 2020-11-12 NOTE — CONSULTS
Palliative Care:     Initiated for support and goals. Pt presents from another hospital with large left pleural effusion. She is s/p thoracentesis yesterday. She is awake, [place] but alert and pleasant. She is able to answer questions and tells me she lives in her own home and her son lives with her. She asked me, Bhaskar Hernandes old do you think I am?\"  \"well this is 2020 and if I live till February 2021, I will be 89. But if I don't I will be with Belkys Valeriovadim. \"    Pt's granddaughter [facility] is her decision maker. Past Medical History:        Past Medical History:   Diagnosis Date    Blood circulation, collateral     Cerebral artery occlusion with cerebral infarction ([facility] [address]     Diabetes mellitus ([facility] [address]     Hyperlipidemia     Hypertension     Palliative care patient 11/12/2020       Advance Directives:    DNR  ACP completed     Pain/Other Symptoms:    Denies pain at this time. Psychological/Spiritual:    Pt has attended [employer] and says she has missed [employer] recently. Plan:   Continue oxygen and medical treatments. Speech following to evaluate swallow difficulty. Patient/family discussion r/t goals: Pt hopes to return home with family. Pt says she has been in a facility in the past.    Support and encouragement provided. Will follow POC.     Electronically signed by Dovie Blizzard, RN on 11/12/2020 at 11:53 AM

## 2020-11-12 NOTE — CARE COORDINATION
Date / Time of Evaluation: 11/12/2020 1:25 PM  Assessment Completed by: Jacquie Nash    Patient Admission Status: Inpatient [101]      Current PCP:  Marcelo Grace  PCP verified? yes    Initial Assessment Completed at bedside with:  Pt and then phone call to her granddaughter at her request; pt very [facility]    Emergency Contacts:  Extended Emergency Contact Information  Primary Emergency Contact: Alla Lara  Address: [address] [place] [address] Phone: [phone]  Relation: Child  [facility] Phone: [phone]  Relation: Unknown    Advance Directives: Code Status:  DNR-CC    Financial:  Payor: MEDICAID - OTHER STATE / Plan: MEDICAID [address] / Product Type: *No Product type* /     Pre-Cert required for SNF:  no    Have Long Term Care Insurance:  no    Pharmacy: No Pharmacies Listed    Potential assistance purchasing medications?   no    ADLS:  Support System:  Family Members, Children    Current Home Environment:  Home with son  Steps:  no    Plans to RETURN to current housing: no  Barriers to RETURNING to current housing:  Weakness/ability    Currently ACTIVE with Home Health CARE:  no  [address]:  no    DME Provider:  no    Had HOME OXYGEN prior to admission:  no    Active with HD/PD prior to admission: no  Nephrologist:  no  HD Center:  no    Transition Plan:   normally home with her son; gdtr is the decision-maker; she wants a SNF referral    Transportation PLAN for Discharge:  Family car    Factors facilitating achievement of predicted outcomes: family support; possible SNF    Barriers to discharge:  SNF acceptance      Additional CM/SW Notes: SW visited with Pt at bedside very [facility]; confirmed she lives at home and her son lives with her; and she stated her granddaughter handled all of her affairs; Pt's granddaughter stated she doesn't think it is in Pt's best interest to return home; she said the son is there

## 2020-11-12 NOTE — ACP (ADVANCE CARE PLANNING)
Advance Care Planning     Advance Care Planning Activator (Inpatient)  Conversation Note      Date of ACP Conversation: 11/12/2020    Conversation Conducted with:  PtJaydon    ACP Activator: [address] Decision Maker:     Current Designated Health Care Decision Maker:   Primary Decision Maker: Nina Hager - Unknown - [phone]    Supplemental (Other) Decision MakerBluis Mcgovern Child - [phone]    Care Preferences    Ventilation: \"If you were in your present state of health and suddenly became very ill and were unable to breathe on your own, what would your preference be about the use of a ventilator (breathing machine) if it were available to you? \"      Would the patient desire the use of ventilator (breathing machine)?:   NO          Resuscitation  \"CPR works best to restart the heart when there is a sudden event, like a heart attack, in someone who is otherwise healthy. Unfortunately, CPR does not typically restart the heart for people who have serious health conditions or who are very sick. \"    \"In the event your heart stopped as a result of an underlying serious health condition, would you want attempts to be made to restart your heart (answer \"yes\" for attempt to resuscitate) or would you prefer a natural death (answer \"no\" for do not attempt to resuscitate)? \"  NO      .        Conversation Outcomes:  [x] ACP discussion completed      Electronically signed by Vincent Argueta RN on 11/12/2020 at 11:56 AM

## 2020-11-12 NOTE — PROGRESS NOTES
Speech Language Pathology  Facility/Department: [facility] 3 SHELLY/VAS/MED   CLINICAL BEDSIDE SWALLOW EVALUATION AND SPEECH LANGUAGE COGNITIVE EVALUATION    NAME: Nydia Barton  : 1931  MRN: [id number]  ADMISSION DATE: 2020   Date of Eval: 2020  Evaluating Therapist: Mariel Godinez MS CCC-SLP    ADMITTING DIAGNOSIS:   has Pneumonia and Palliative care patient on their problem list.    History of Present Illness:  Nydia Barton presents to [facility] as transfer with large left plural effusion, possible PNA. Started on Azithromycin and Rocephin. ER was not able to drain the effusion. US guided thoracentesis is ordered for today am. Pulmonary service consulted. Labs pending  Pt is demented and not answering any questions       Recent Chest Xray/CT of Chest:   Narrative    Examination. XR CHEST PORTABLE 2020 8:01 AM    History: Shortness of breath. A single frontal portable upright view of the chest is obtained. There    is no previous study for comparison. There is a left lower lung consolidation and a large pleural effusion,    free and loculated, in the left chest extending into the left apex. The right lung is poorly distended. No obvious abnormality, infiltrate    or consolidation. The Heart size is not evaluated in this study. The distal trachea is displaced towards the right which is probably    due to a tortuous and atheromatous thoracic aorta. There is no pneumothorax.         Impression    Consolidation and pleural effusion in the left chest.    The right lung is unremarkable.     Signed by Dr Jody Pastrana on 2020 3:41 PM          Current Diet level:  Current Diet : NPO  Current Liquid Diet : NPO      Pain:  Pain Assessment  Pain Level: 7    Reason for Referral  Nydia Barton was referred for a bedside swallow evaluation to assess the efficiency of her swallow function, identify signs and symptoms of aspiration and make recommendations regarding safe dietary consistencies, effective compensatory strategies, and safe eating environment. Impression  Mild oral and pharyngeal phases of the swallow. Oral residue observed following solids which places her at risk for pocketing. She did demonstrate consistent overt s/s of aspiration with thin liquids and nectar thick liquids. Audible backflow of foods and liquid was noted following several presentations. Recommend honey thick liquids via cup or straw, meds whole in applesauce and tray set up. Recommend GI consult if not already ordered due to history of esophageal dysphagia with EGD and and dilations as well as audible backflow of liquids and foods at the bedside. Dysphagia Outcome Severity Scale: Level 5: Mild dysphagia- Distant supervision. May need one diet consistency restricted     Dysphagia Diagnosis: Mild oral stage dysphagia; Moderate pharyngeal stage dysphagia  Treatment Plan  Requires SLP Intervention: Yes  Duration/Frequency of Treatment: 1x/day, 3-5x/week  D/C Recommendations: To be determined       Recommended Diet and Intervention  Diet Solids Recommendation: Dysphagia Pureed (Dysphagia I)  Liquid Consistency Recommendation: Moderately Thick (Honey)  Recommended Form of Meds: Whole with puree     Therapeutic Interventions: Oral care;Diet tolerance monitoring;Patient/Family education; Therapeutic PO trials with SLP    Compensatory Swallowing Strategies  Compensatory Swallowing Strategies: Upright as possible for all oral intake;Remain upright for 30-45 minutes after meals; Alternate solids and liquids;Small bites/sips    Treatment/Goals  Short-term Goals  Goal 1: The patient will tolerate a dysphagia 1 diet, puree with honey thick liquids with minimal overt s/s of aspiration. Goal 2: Staff/caregivers will complete oral hygiene daily to prevent aspiration of oral bacteria. Goal 3: SLP will return to re-assess swallow function and determine readiness for upgrade.     General  Chart Reviewed: Yes  Behavior/Cognition: Alert; Cooperative  Temperature Spikes Noted: No  Respiratory Status: O2 via nasual cannula  O2 Device: Nasal cannula  Liters of Oxygen: 2 L  Communication Observation: Functional  Follows Directions: Simple  Dentition: Dentures top;Dentures bottom  Patient Positioning: Upright in bed  Baseline Vocal Quality: Normal  Volitional Cough: Strong  Consistencies Administered: Reg solid; Dysphagia Pureed (Dysphagia I); Thin - straw; Thin - cup;Nectar - straw;Nectar - cup;Honey - straw;Honey - cup  WBC: 12.8    Prior Dysphagia History:   Pt reports history of esophageal dysphagia with EGD and dilations. She reported her last one was three years ago in [facility]. She states she was receiving a regular diet wtih thin liquids prior to admission. Oral Motor Deficits  Oral/Motor  Oral Motor: (Left labial asymmetry. No lingual deviation. U/L dentures)    Oral Phase Dysfunction  Oral Phase  Oral Phase - Comment: Mild oral phase dysphagia due to scattered oral residue following dry solids. Several sips of honey thick liquids did clear the oral cavity. No labial spillage noted. Indicators of Pharyngeal Phase Dysfunction   Pharyngeal Phase   Pharyngeal: Mild pharyngeal phase dysphagia due to consistent cough response with thin liquids via cup and straw as well as nectar thick liquids via cup and straw. Weak hyolaryngeal elevation and delayed swallow responses. She did tolerate honey thick liquids via cup and straw without overt s/s of aspiration. She also tolerated pureed foods well. Speech/language cognitive evaluation: No dysarthria or voicing difficulties noted. Automatic speech tasks at 100%, orientation at 100%, naming objects and pictures at 100%, answering yes/no questions at 100%, answering open ended questions at 100%. Short term memory tasks at 40%. She is very hard of hearing and does not have her hearing aids with her.  Her medical record does report a history of confusion. Prognosis  Good    Education  Patient Education: Discussed results [facility] nursing.   Patient Education Response: Demonstrated understanding              11/12/2020 9:47 AM      Electronically Signed By:  Daniel Cuenca M.S., CCC-SLP  11/12/2020,9:50 AM.

## 2020-11-12 NOTE — PROGRESS NOTES
Physician Progress Note      Jani Noble  [facility] #:                  [id number]  :                       1931  ADMIT DATE:       2020 6:07 AM  DISCH DATE:  RESPONDING  PROVIDER #:        MORENA LOOMIS DO          QUERY TEXT:    Patient admitted with pleural effusion and pneumonia. Noted documentation of   acute respiratory failure in Pulmonology consult. No documentation of distress   or accessory muscle usage. No ABGs in EPIC. Please indicate one of the   following and document in the medical record: The medical record reflects the following:  Risk Factors: Pneumonia, pleural effusion  Clinical Indicators: No ABGs, no distress noted, documented oxygen saturations   > 90%  Treatment: Thoracentesis, Pulmonology consult, IS. Acute Respiratory Failure Clinical Indicators per 3M MS-DRG Training Guide and   Quick Reference Guide:  pO2 < 60 mmHg or SpO2 (pulse oximetry) < 91% breathing room air  pCO2 > 50 and pH < 7.35  P/F ratio (pO2 / FIO2) < 300  pO2 decrease or pCO2 increase by 10 mmHg from baseline (if known)  Supplemental oxygen of 40% or more  Presence of respiratory distress, tachypnea, dyspnea, shortness of breath,   wheezing  Unable to speak in complete sentences  Use of accessory muscles to breathe  Extreme anxiety and feeling of impending doom  Tripod position  Confusion/altered mental status/obtunded  Options provided:  -- Acute Respiratory Failure currently as evidenced by, Please document   evidence.   -- Currently resolved Acute Respiratory Failure was evidenced by, Please   document evidence  -- Acute Respiratory Failure ruled out after study  -- Other - I will add my own diagnosis  -- Disagree - Not applicable / Not valid  -- Disagree - Clinically unable to determine / Unknown  -- Refer to Clinical Documentation Reviewer    PROVIDER RESPONSE TEXT:    This patient is in acute respiratory failure as evidenced by requirement for   supplemental oxygen since ED presentation at outside facility    Query created by: Brittney Wise on 11/12/2020 4:33 PM      Electronically signed by:  Chaparro Bass DO 11/12/2020 4:47 PM

## 2020-11-13 ENCOUNTER — OUTSIDE FACILITY SERVICE (OUTPATIENT)
Dept: PULMONOLOGY | Facility: CLINIC | Age: 85
End: 2020-11-13

## 2020-11-13 PROBLEM — I47.1 SUPRAVENTRICULAR TACHYCARDIA (HCC): Status: ACTIVE | Noted: 2020-11-13

## 2020-11-13 LAB
ANION GAP SERPL CALCULATED.3IONS-SCNC: 10 MMOL/L (ref 7–19)
BUN BLDV-MCNC: 9 MG/DL (ref 8–23)
CALCIUM SERPL-MCNC: 8.7 MG/DL (ref 8.8–10.2)
CHLORIDE BLD-SCNC: 100 MMOL/L (ref 98–111)
CO2: 24 MMOL/L (ref 22–29)
CREAT SERPL-MCNC: 0.5 MG/DL (ref 0.5–0.9)
D DIMER: 4.64 UG/ML FEU (ref 0–0.48)
GFR AFRICAN AMERICAN: >59
GFR NON-AFRICAN AMERICAN: >60
GLUCOSE BLD-MCNC: 146 MG/DL (ref 74–109)
GLUCOSE BLD-MCNC: 160 MG/DL (ref 70–99)
HCT VFR BLD CALC: 26.9 % (ref 37–47)
HEMOGLOBIN: 8.7 G/DL (ref 12–16)
MAGNESIUM: 1.2 MG/DL (ref 1.6–2.4)
MCH RBC QN AUTO: 28.6 PG (ref 27–31)
MCHC RBC AUTO-ENTMCNC: 32.3 G/DL (ref 33–37)
MCV RBC AUTO: 88.5 FL (ref 81–99)
MYCOPLASMA PNEUMONIAE IGG: 0.13 U/L
MYCOPLASMA PNEUMONIAE IGM: 0.09 U/L
PDW BLD-RTO: 13.3 % (ref 11.5–14.5)
PERFORMED ON: ABNORMAL
PLATELET # BLD: 357 K/UL (ref 130–400)
PMV BLD AUTO: 10.1 FL (ref 9.4–12.3)
POTASSIUM REFLEX MAGNESIUM: 3.4 MMOL/L (ref 3.5–5)
RBC # BLD: 3.04 M/UL (ref 4.2–5.4)
SODIUM BLD-SCNC: 134 MMOL/L (ref 136–145)
T4 FREE: 1.49 NG/DL (ref 0.93–1.7)
TSH SERPL DL<=0.05 MIU/L-ACNC: 1.7 UIU/ML (ref 0.27–4.2)
WBC # BLD: 16.9 K/UL (ref 4.8–10.8)

## 2020-11-13 PROCEDURE — 2580000003 HC RX 258: Performed by: FAMILY MEDICINE

## 2020-11-13 PROCEDURE — 2580000003 HC RX 258: Performed by: HOSPITALIST

## 2020-11-13 PROCEDURE — 83735 ASSAY OF MAGNESIUM: CPT

## 2020-11-13 PROCEDURE — 2140000000 HC CCU INTERMEDIATE R&B

## 2020-11-13 PROCEDURE — 82947 ASSAY GLUCOSE BLOOD QUANT: CPT

## 2020-11-13 PROCEDURE — 84443 ASSAY THYROID STIM HORMONE: CPT

## 2020-11-13 PROCEDURE — 36415 COLL VENOUS BLD VENIPUNCTURE: CPT

## 2020-11-13 PROCEDURE — 80048 BASIC METABOLIC PNL TOTAL CA: CPT

## 2020-11-13 PROCEDURE — 6360000002 HC RX W HCPCS: Performed by: HOSPITALIST

## 2020-11-13 PROCEDURE — 94640 AIRWAY INHALATION TREATMENT: CPT

## 2020-11-13 PROCEDURE — 6370000000 HC RX 637 (ALT 250 FOR IP): Performed by: HOSPITALIST

## 2020-11-13 PROCEDURE — 92526 ORAL FUNCTION THERAPY: CPT

## 2020-11-13 PROCEDURE — 84439 ASSAY OF FREE THYROXINE: CPT

## 2020-11-13 PROCEDURE — 93005 ELECTROCARDIOGRAM TRACING: CPT | Performed by: FAMILY MEDICINE

## 2020-11-13 PROCEDURE — 2700000000 HC OXYGEN THERAPY PER DAY

## 2020-11-13 PROCEDURE — 85379 FIBRIN DEGRADATION QUANT: CPT

## 2020-11-13 PROCEDURE — 92523 SPEECH SOUND LANG COMPREHEN: CPT

## 2020-11-13 PROCEDURE — 99232 SBSQ HOSP IP/OBS MODERATE 35: CPT | Performed by: INTERNAL MEDICINE

## 2020-11-13 PROCEDURE — 85027 COMPLETE CBC AUTOMATED: CPT

## 2020-11-13 PROCEDURE — 2500000003 HC RX 250 WO HCPCS: Performed by: FAMILY MEDICINE

## 2020-11-13 RX ORDER — DILTIAZEM HYDROCHLORIDE 5 MG/ML
10 INJECTION INTRAVENOUS ONCE
Status: COMPLETED | OUTPATIENT
Start: 2020-11-13 | End: 2020-11-13

## 2020-11-13 RX ORDER — GUAIFENESIN/DEXTROMETHORPHAN 100-10MG/5
5 SYRUP ORAL EVERY 4 HOURS PRN
Status: DISCONTINUED | OUTPATIENT
Start: 2020-11-13 | End: 2020-11-20 | Stop reason: HOSPADM

## 2020-11-13 RX ADMIN — DILTIAZEM HYDROCHLORIDE 10 MG: 5 INJECTION INTRAVENOUS at 13:57

## 2020-11-13 RX ADMIN — PIPERACILLIN AND TAZOBACTAM 3.38 G: 3; .375 INJECTION, POWDER, LYOPHILIZED, FOR SOLUTION INTRAVENOUS at 22:28

## 2020-11-13 RX ADMIN — Medication 1000 MG: at 11:53

## 2020-11-13 RX ADMIN — DILTIAZEM HYDROCHLORIDE 5 MG/HR: 5 INJECTION INTRAVENOUS at 14:16

## 2020-11-13 RX ADMIN — LEVOFLOXACIN 750 MG: 5 INJECTION, SOLUTION INTRAVENOUS at 09:55

## 2020-11-13 RX ADMIN — INSULIN LISPRO 1 UNITS: 100 INJECTION, SOLUTION INTRAVENOUS; SUBCUTANEOUS at 09:55

## 2020-11-13 RX ADMIN — PIPERACILLIN AND TAZOBACTAM 3.38 G: 3; .375 INJECTION, POWDER, LYOPHILIZED, FOR SOLUTION INTRAVENOUS at 05:20

## 2020-11-13 RX ADMIN — SODIUM CHLORIDE: 9 INJECTION, SOLUTION INTRAVENOUS at 00:52

## 2020-11-13 RX ADMIN — IPRATROPIUM BROMIDE AND ALBUTEROL SULFATE 1 AMPULE: .5; 3 SOLUTION RESPIRATORY (INHALATION) at 19:00

## 2020-11-13 RX ADMIN — IPRATROPIUM BROMIDE AND ALBUTEROL SULFATE 1 AMPULE: .5; 3 SOLUTION RESPIRATORY (INHALATION) at 06:55

## 2020-11-13 RX ADMIN — SODIUM CHLORIDE 75 ML/HR: 9 INJECTION, SOLUTION INTRAVENOUS at 11:54

## 2020-11-13 NOTE — PLAN OF CARE
Problem: Falls - Risk of:  Goal: Will remain free from falls  Description: Will remain free from falls  11/12/2020 2326 by Aurora Rose RN  Outcome: Ongoing  11/12/2020 1553 by Aleene Councilman, LPN  Outcome: Ongoing  Goal: Absence of physical injury  Description: Absence of physical injury  11/12/2020 2326 by Aurora Rose RN  Outcome: Ongoing  11/12/2020 1553 by Aleene Councilman, LPN  Outcome: Ongoing     Problem: Skin Integrity:  Goal: Will show no infection signs and symptoms  Description: Will show no infection signs and symptoms  11/12/2020 2326 by Aurora Rose RN  Outcome: Ongoing  11/12/2020 1553 by Aleene Councilman, LPN  Outcome: Ongoing  Goal: Absence of new skin breakdown  Description: Absence of new skin breakdown  11/12/2020 2326 by Aurora Rose RN  Outcome: Ongoing  11/12/2020 1553 by Aleene Councilman, LPN  Outcome: Ongoing

## 2020-11-13 NOTE — PROGRESS NOTES
Esdras Reyes received from [id number] to room # [id number] . Mental Status: Patient is oriented, alert and able to concentrate and follow conversation. Vitals:    11/13/20 0655   BP:    Pulse:    Resp: 20   Temp:    SpO2: 98%     Placed on cardiac monitor: Yes. Box # mx [id number]. Belongings: Glasses, Hearing Aides bilateral with patient at bedside . Family at bedside No.  Oriented Patient to room. Call light within reach. Yes. Transfer was: Well tolerated by patient. .    Electronically signed by Samantha Scott RN on 11/13/2020 at 12:02 PM

## 2020-11-13 NOTE — PROGRESS NOTES
Received patient from 3rd floor via bed settled into room 722 with side rails x 2 tele [facility]. Waiting for Cardizem drip from pharmacy.

## 2020-11-13 NOTE — PROGRESS NOTES
Followed by   Creston Sicard dilTIAZem 125 mg in dextrose 5 % 125 mL infusion  5 mg/hr Intravenous Continuous Davidson Thorpe,         guaiFENesin-dextromethorphan (ROBITUSSIN DM) 100-10 MG/5ML syrup 5 mL  5 mL Oral Q4H PRN Con Stanley DO        0.9 % sodium chloride infusion   Intravenous Continuous Juanna Saint, MD 75 mL/hr at 11/13/20 1154 75 mL/hr at 11/13/20 1154    sodium chloride flush 0.9 % injection 10 mL  10 mL Intravenous 2 times per day Juanna Saint, MD   10 mL at 11/12/20 5395    sodium chloride flush 0.9 % injection 10 mL  10 mL Intravenous PRN Juanna Saint, MD        acetaminophen (TYLENOL) tablet 650 mg  650 mg Oral Q6H PRN Juanna Saint, MD   650 mg at 11/11/20 1948    Or    acetaminophen (TYLENOL) suppository 650 mg  650 mg Rectal Q6H PRN Juanna Saint, MD        polyethylene glycol (GLYCOLAX) packet 17 g  17 g Oral Daily PRN Juanna Saint, MD        promethazine (PHENERGAN) tablet 12.5 mg  12.5 mg Oral Q6H PRN Juanna Saint, MD        Or    ondansetron [facility]) injection 4 mg  4 mg Intravenous Q6H PRN Juanna Saint, MD   4 mg at 11/12/20 1053    ipratropium-albuterol (DUONEB) nebulizer solution 1 ampule  1 ampule Inhalation Q4H WA Juanna Saint, MD   1 ampule at 11/13/20 0655    piperacillin-tazobactam (ZOSYN) 3.375 g in dextrose 5 % 50 mL IVPB extended infusion (mini-bag)  3.375 g Intravenous Q8H Juanna Saint, MD   Stopped at 11/13/20 0920    And    levoFLOXacin (LEVAQUIN) 750 MG/150ML infusion 750 mg  750 mg Intravenous Q24H Juanna Saint, MD   Stopped at 11/13/20 1110    insulin lispro (HUMALOG) injection vial 0-6 Units  0-6 Units Subcutaneous TID WC Juanna Saint, MD   1 Units at 11/13/20 0955    insulin lispro (HUMALOG) injection vial 0-3 Units  0-3 Units Subcutaneous Nightly Juanna Saint, MD   2 Units at 11/12/20 2301    glucose (GLUTOSE) 40 % oral gel 15 g  15 g Oral PRN Arvind Ramos MD        dextrose 50 % IV solution  12.5 g Intravenous PRN Arvind Ramos MD        glucagon (rDNA) injection 1 mg  1 mg Intramuscular PRN Arvind Ramos MD        dextrose 5 % solution  100 mL/hr Intravenous PRN Arvind Ramos MD        vancomycin [facility]) intermittent dosing (placeholder)   Other RX Bouchra Campbell MD        vancomycin (VANCOCIN) 1 g in dextrose 5% 250 mL IVPB  1,000 mg Intravenous Q24H Arvind Ramos MD 0 mL/hr at 11/12/20 1113 1,000 mg at 11/13/20 1153        Labs:     Recent Labs     11/11/20 0741 11/12/20  0316 11/13/20  0140   WBC 14.0* 12.8* 16.9*   RBC 3.25* 3.14* 3.04*   HGB 9.4* 9.0* 8.7*   HCT 29.2* 27.8* 26.9*   MCV 89.8 88.5 88.5   MCH 28.9 28.7 28.6   MCHC 32.2* 32.4* 32.3*    357 357     Recent Labs     11/11/20 0741 11/12/20 0316 11/13/20  0140   * 133* 134*   K 3.7 3.9 3.4*   ANIONGAP 15 10 10   CL 97* 99 100   CO2 23 24 24   BUN 11 10 9   CREATININE 0.6 0.5 0.5   GLUCOSE 170* 157* 146*   CALCIUM 9.1 8.6* 8.7*     Recent Labs     11/11/20  0741 11/13/20  0140   MG 1.6 1.2*     Recent Labs     11/11/20 0741   AST 19   ALT 10   BILITOT 0.4   ALKPHOS 64     ABGs:No results for input(s): PH, PO2, PCO2, HCO3, BE, O2SAT in the last 72 hours. Troponin T:   Recent Labs     11/11/20 0741   TROPONINI <0.01     INR:   Recent Labs     11/11/20 0741   INR 1.25*     Lactic Acid: No results for input(s): LACTA in the last 72 hours.     Objective:   Vitals: /69   Pulse 167   Temp 97.9 °F (36.6 °C) (Temporal)   Resp 20   Ht 5' 7\" (1.702 m)   Wt 123 lb (55.8 kg)   SpO2 98%   BMI 19.26 kg/m²   24HR INTAKE/OUTPUT:      Intake/Output Summary (Last 24 hours) at 11/13/2020 1322  Last data filed at 11/13/2020 0946  Gross per 24 hour   Intake 2632 ml   Output --   Net 2632 ml     General appearance: alert and cooperative with exam  HEENT: atraumatic, eyes with clear conjunctiva and normal lids, pupils and irises normal, external ears and nose are normal, lips normal  Neck without masses, lympadenopathy, bruit, thyroid normal  Lungs: no increased work of breathing, diminished breath sounds LLL and wheezes bilaterally  Heart: tachycardic, irregular, no murmur  Abdomen: soft, non-tender; bowel sounds normal; no masses,  no organomegaly  Extremities: extremities normal, atraumatic, no cyanosis or edema  Neurologic: normal sensation, alert but very confused, has memorized the year, flattened affect  Skin: no rashes, nodules    Assessment and Plan:   Principal Problem:    Sepsis ([facility] [address]  Active Problems:    Pneumonia    Palliative care patient    Pleural effusion    Hyponatremia    Normocytic anemia    Type 2 diabetes mellitus ([facility] [address]    History of stroke with residual deficit    Dementia (HCC)    Hypocalcemia    Essential hypertension    Supraventricular tachycardia ([facility] [address]  Resolved Problems:    * No resolved hospital problems. *      Transfer to PCU. Diltiazem bolus and drip. POD #2 thoracentesis. Day #3 levofloxacin, piperacillin/tazobactam, and vancomycin. Cultures pending to guide therapy. Pulmonology consulted, appreciate input. Consult cardiology. Patient is off her baseline mental status. Suspect septic metabolic encephalopathy due to pneumonia.     Advance Directive: DNR-CC    DVT prophylaxis: SCDs    Discharge planning: DO Godfrey Hernandez Hospitalist

## 2020-11-13 NOTE — PROGRESS NOTES
Speech Language Pathology  Facility/Department: [facility] 7 PROGRESSIVE CARE  Initial Speech/Language/Cognitive Assessment  Swallow Therapy     NAME: Sera Meyers  : 1931   MRN: [id number]  ADMISSION DATE: 2020  ADMITTING DIAGNOSIS: has Pneumonia; Palliative care patient; Pleural effusion; Sepsis ([facility] [address] Hyponatremia; Normocytic anemia; Type 2 diabetes mellitus ([facility] [address] History of stroke with residual deficit; Dementia ([facility] [address] Hypocalcemia; and Essential hypertension on their problem list.    Date of Eval/Treat: 2020   Evaluating Therapist: Ivania Jo SLP    Assessment:  Completed assessment. SLP ranked functional intelligibility of speech for unfamiliar listeners at 100% in utterances without background noise present; decreased volume of speech was noted. Patient also exhibited decreased sustained attention, slow processing, delayed and decreased auditory comprehension (felt partially related to [facility]), and delayed verbalizations with mild-moderate fragmentations observed where the patient started expressions but then discontinued (felt partially related to [facility]). Also re-assessed patient's swallowing function. Patient exhibits slow, decreased oral prep and decreased oral transit of more solid consistencies (bite sized consistency residue was slow but cleared from the mouth with additional dry swallows). Patient also exhibits inconsistently fast oral transit and suspected swallow delay with thin liquids and sluggish, mild-moderately decreased laryngeal elevation for swallow airway protection. Even so, no outward S/S penetration/aspiration was noted with any puree consistency presentation, bite sized consistency trial, honey thick liquid presentation, nectar thick liquid trial, or thin H2O trial administered during treatment session this date. At this time, would recommend continuation puree consistency. Trial nectar thick liquids. Do recommend meds in pudding/applesauce.  If patient receives mouth care prior to intake, okay for ice chips and small sips thin H2O IN BETWEEN MEALS for comfort. Will continue to follow for swallowing and to monitor cognitive-linguistic functioning (unaware of baseline mentation). Subjective:  General  Chart Reviewed: Yes  Patient assessed for rehabilitation services?: Yes  Family / Caregiver Present: No    Objective: Auditory Comprehension  Comprehension:  (While delayed, patient demonstrated ability to answer simple yes/no questions regarding immediate environment and current state of being at independent level. While delayed, patient demonstrated ability to follow simple 1 step commands independently. Delays were noted and break down was observed during yes/no questions of increased complexity and during complex 1 and simple 2 step commands at independent level and with provision of multiple repetitions. Do feel this was partially related to [facility].)    Expression  Primary Mode of Expression:  (Confrontation naming of items in room was considered to be delayed but appropriate. Structured responsive speech and responses in natural conversation were considered to be delayed and mild-moderately fragmented where the patient started expressions but then discontinued. Do feel this was partially related to patient being [facility].)    Motor Speech:  (SLP ranked functional intelligibility of speech for unfamiliar listeners at 100% in verbalizations without background noise present. Decreased volume of speech was noted.)    Overall Orientation Status:  (Patient demonstrated ability to verbalize name, birthday, age, address, city, hospital, month, and year at independent level. Patient did not verbalize phone number, state, BRIANNA, or date independently.)    Additional Assessments:  Re-assessed patient's swallowing function. With puree consistency presentations, administered by SLP, oral transit varied from 2-3 seconds in length and no oral cavity residue was noted post swallows.  With bite sized consistency trials presented by SLP, patient exhibited slow vertical jaw movement at the front of the mouth during oral prep. Oral transit of bite sized consistency varied from 2-3 seconds in length and moderate oral cavity residue was observed post swallows; residue was slow but cleared from the mouth with additional dry swallows. Oral transit of honey thick liquid presentations and nectar thick liquid trials, all presented via cup by SLP, primarily measured 1-2 seconds in length. Patient exhibited inconsistently fast oral transit of thin H2O trials presented via cup by SLP. Laryngeal elevation during swallow initiation was considered to be sluggish and mild-moderately decreased for swallow airway protection. Even so, no outward S/S penetration/aspiration was noted with any puree consistency presentation, bite sized consistency trial, honey thick liquid presentation, nectar thick liquid trial, or thin H2O trial administered during treatment session this date. At this time, would recommend continuation puree consistency. Trial nectar thick liquids. Do recommend meds in pudding/applesauce. If patient receives mouth care prior to intake, okay for ice chips and small sips thin H2O IN BETWEEN MEALS for comfort. Will continue to follow.     Electronically signed by MALISSA Smith on 11/13/2020 at 12:51 PM

## 2020-11-13 NOTE — PROGRESS NOTES
Monitor room called stated hr up to 160-170. Cardizem drip not here from pharmacy, gave 10 mg Cardizem bolus.  After walking back to medications room pharmacy here with Cardizem drip.messaged Dr. Harika costa

## 2020-11-13 NOTE — PROGRESS NOTES
Pulmonary and Critical Care Progress Note [facility]    Patient: Maikel Arthur  2/17/1931   MR# [id number]   Acct# [de-identified]  11/13/20   1:25 PM  Referring Provider: Lawanda Man DO    Chief Complaint: Large pleural effusion    Interval history:  Patient was seen in follow-up visit in pulmonary rounds in medical floor today. She received Cardizem for cardiac arrhythmias. She is currently on 2 L supplemental oxygen. Feeling better after thoracentesis. She has dementia and hearing impairment and could not want any further history. Currently on antibiotics for presumed pneumonia. Pleural fluid appears to be exudative in nature. Medications:   dilTIAZem, 10 mg, Intravenous, Once **FOLLOWED BY** dilTIAZem (CARDIZEM) 125 mg in dextrose 5% 125 mL infusion, 5 mg/hr, Intravenous, Continuous    sodium chloride flush, 10 mL, Intravenous, 2 times per day    ipratropium-albuterol, 1 ampule, Inhalation, Q4H WA    piperacillin-tazobactam, 3.375 g, Intravenous, Q8H **AND** levofloxacin, 750 mg, Intravenous, Q24H    insulin lispro, 0-6 Units, Subcutaneous, TID WC    insulin lispro, 0-3 Units, Subcutaneous, Nightly    vancomycin (VANCOCIN) intermittent dosing (placeholder), , Other, RX Placeholder    vancomycin, 1,000 mg, Intravenous, Q24H     Review of Systems: Review of Systems   Unable to perform ROS: Dementia     Physical Exam:  Blood pressure 117/69, pulse 167, temperature 97.9 °F (36.6 °C), temperature source Temporal, resp. rate 20, height 5' 7\" (1.702 m), weight 123 lb (55.8 kg), SpO2 98 %. Intake/Output Summary (Last 24 hours) at 11/13/2020 1325  Last data filed at 11/13/2020 0946  Gross per 24 hour   Intake 2632 ml   Output --   Net 2632 ml     Physical Exam:    Elderly,  female lying in the bed, awake, alert and pleasantly disoriented, dementia at baseline  HEENT: Atraumatic, normocephalic, pupil equal reactive light and accommodation.   Very hard of hearing  Neck: Supple, no mass, no jugular distention, no lymphadenopathy, no thyroid swelling. Heart: Sounds normal, irregular, rate normal, no murmur. No gallop. Lungs: diminished air entry in left lower lung. Abdomen: Soft, nontender, bowel sounds present, no organomegaly. Extremities: No edema, normal pulses, normal color  Neurologic: Grossly intact ,no focal deficit. Skin: A few chronic changes, no breakdown. Psych: Patient appears to have forgetfulness and may have dementia. Recent Labs     11/11/20  0741 11/12/20 0316 11/13/20  0140   WBC 14.0* 12.8* 16.9*   RBC 3.25* 3.14* 3.04*   HGB 9.4* 9.0* 8.7*   HCT 29.2* 27.8* 26.9*    357 357   MCV 89.8 88.5 88.5   MCH 28.9 28.7 28.6   MCHC 32.2* 32.4* 32.3*   RDW 13.1 13.2 13.3      Recent Labs     11/11/20  0741 11/12/20 0316 11/13/20  0140 11/13/20  1226   * 133* 134*  --    K 3.7 3.9 3.4*  --    CL 97* 99 100  --    CO2 23 24 24  --    BUN 11 10 9  --    CREATININE 0.6 0.5 0.5  --    CALCIUM 9.1 8.6* 8.7*  --    GLUCOSE 170* 157* 146*  --    AST 19  --   --   --    ALT 10  --   --   --    ALKPHOS 64  --   --   --    BILITOT 0.4  --   --   --    MG 1.6  --  1.2*  --    TROPONINI <0.01  --   --   --    INR 1.25*  --   --   --    TSH  --   --   --  1.700      Recent Labs     11/11/20  0740 11/11/20  1500   BC Gram stain Aerobic bottle:  Gram positive cocci in clusters  resembling Staphylococcus  Culture in progress  Please notify Physician   Bottle volume = 10 ml  *  Sensitivity to follow  --    LABGRAM  --  Many WBC's (Polymorphonuclear) present  No organisms seen       Radiograph:   Examination. XR CHEST PORTABLE 11/11/2020 8:01 AM    History: Shortness of breath. A single frontal portable upright view of the chest is obtained. There    is no previous study for comparison. There is a left lower lung consolidation and a large pleural effusion,    free and loculated, in the left chest extending into the left apex. The right lung is poorly distended.  No obvious abnormality, infiltrate    or consolidation. The Heart size is not evaluated in this study. The distal trachea is displaced towards the right which is probably    due to a tortuous and atheromatous thoracic aorta. There is no pneumothorax.         Impression    Consolidation and pleural effusion in the left chest.    The right lung is unremarkable. Signed by Dr Guerda Decker on 11/11/2020 3:41 PM        My radiograph interpretation: Pleural effusion with associating atelectasis on the left, poor aeration of the right lung, 11-12-20    Pulmonary Assessment:    1. Acute hypoxic respiratory failure, stable to improved  2. Large left-sided pleural effusion  3. Status post ultrasound-guided thoracentesis  4. Possible heart failure versus parapneumonic effusion  5. Community-acquired pneumonia  6. Hypertension  7. Hyperlipidemia  8. Diabetes mellitus  9. History of stroke  10. Dysphagia    Recommend:     · Supplemental O2 if needed for saturation above 90, wean down as tolerated  · Continue current antibiotic coverage and with full poor fluid culture results. · Management of cardiac arrhythmias per primary care will continue following and make further recommendations. .  · PT OT and speech therapy evaluation due to risk of aspiration. · Pleural fluid is exudative in nature with high protein and LDH. Most likely causes parapneumonic effusion. · Respiratory: Incentive spirometry possible but it is difficulty with dementia. · Bronchodilators to continue end of home oxygen evaluation prior to the discharge. · Incentive spirometry  · Will need SCDs for DVT prophylaxis if she cannot mobilize  · DNR status  · Patient may go back to nursing home and may be followed by primary care provider. · Repeat labs and imaging studies as needed.   · Pulmonary team will continue following her and make further recommendations      Electronically signed by   Lisa Ziegler MD   on 11/13/2020, 1:25 PM

## 2020-11-14 ENCOUNTER — OUTSIDE FACILITY SERVICE (OUTPATIENT)
Dept: PULMONOLOGY | Facility: CLINIC | Age: 85
End: 2020-11-14

## 2020-11-14 LAB
ANION GAP SERPL CALCULATED.3IONS-SCNC: 17 MMOL/L (ref 7–19)
BLOOD CULTURE, ROUTINE: ABNORMAL
BLOOD CULTURE, ROUTINE: ABNORMAL
BUN BLDV-MCNC: 12 MG/DL (ref 8–23)
CALCIUM SERPL-MCNC: 8.9 MG/DL (ref 8.8–10.2)
CHLORIDE BLD-SCNC: 96 MMOL/L (ref 98–111)
CO2: 20 MMOL/L (ref 22–29)
CREAT SERPL-MCNC: 0.8 MG/DL (ref 0.5–0.9)
EKG P AXIS: 85 DEGREES
EKG P-R INTERVAL: 134 MS
EKG Q-T INTERVAL: 328 MS
EKG QRS DURATION: 80 MS
EKG QTC CALCULATION (BAZETT): 406 MS
EKG T AXIS: 142 DEGREES
GFR AFRICAN AMERICAN: >59
GFR NON-AFRICAN AMERICAN: >60
GLUCOSE BLD-MCNC: 202 MG/DL (ref 70–99)
GLUCOSE BLD-MCNC: 213 MG/DL (ref 70–99)
GLUCOSE BLD-MCNC: 229 MG/DL (ref 70–99)
GLUCOSE BLD-MCNC: 268 MG/DL (ref 74–109)
GLUCOSE BLD-MCNC: 292 MG/DL (ref 70–99)
GLUCOSE BLD-MCNC: 350 MG/DL (ref 70–99)
HCT VFR BLD CALC: 30.1 % (ref 37–47)
HEMOGLOBIN: 9.3 G/DL (ref 12–16)
MAGNESIUM: 1.3 MG/DL (ref 1.6–2.4)
MCH RBC QN AUTO: 28.4 PG (ref 27–31)
MCHC RBC AUTO-ENTMCNC: 30.9 G/DL (ref 33–37)
MCV RBC AUTO: 92 FL (ref 81–99)
ORGANISM: ABNORMAL
PDW BLD-RTO: 13.5 % (ref 11.5–14.5)
PERFORMED ON: ABNORMAL
PLATELET # BLD: 392 K/UL (ref 130–400)
PMV BLD AUTO: 9.8 FL (ref 9.4–12.3)
POTASSIUM REFLEX MAGNESIUM: 3.4 MMOL/L (ref 3.5–5)
RBC # BLD: 3.27 M/UL (ref 4.2–5.4)
SODIUM BLD-SCNC: 133 MMOL/L (ref 136–145)
VANCOMYCIN TROUGH: 8.4 UG/ML (ref 10–20)
WBC # BLD: 16.9 K/UL (ref 4.8–10.8)

## 2020-11-14 PROCEDURE — 99232 SBSQ HOSP IP/OBS MODERATE 35: CPT | Performed by: INTERNAL MEDICINE

## 2020-11-14 PROCEDURE — 82947 ASSAY GLUCOSE BLOOD QUANT: CPT

## 2020-11-14 PROCEDURE — 80202 ASSAY OF VANCOMYCIN: CPT

## 2020-11-14 PROCEDURE — 94640 AIRWAY INHALATION TREATMENT: CPT

## 2020-11-14 PROCEDURE — 99223 1ST HOSP IP/OBS HIGH 75: CPT | Performed by: INTERNAL MEDICINE

## 2020-11-14 PROCEDURE — 87081 CULTURE SCREEN ONLY: CPT

## 2020-11-14 PROCEDURE — 2700000000 HC OXYGEN THERAPY PER DAY

## 2020-11-14 PROCEDURE — 2580000003 HC RX 258: Performed by: FAMILY MEDICINE

## 2020-11-14 PROCEDURE — 2140000000 HC CCU INTERMEDIATE R&B

## 2020-11-14 PROCEDURE — 85027 COMPLETE CBC AUTOMATED: CPT

## 2020-11-14 PROCEDURE — 83735 ASSAY OF MAGNESIUM: CPT

## 2020-11-14 PROCEDURE — 80048 BASIC METABOLIC PNL TOTAL CA: CPT

## 2020-11-14 PROCEDURE — 2580000003 HC RX 258: Performed by: HOSPITALIST

## 2020-11-14 PROCEDURE — 6370000000 HC RX 637 (ALT 250 FOR IP): Performed by: HOSPITALIST

## 2020-11-14 PROCEDURE — 36415 COLL VENOUS BLD VENIPUNCTURE: CPT

## 2020-11-14 PROCEDURE — 93010 ELECTROCARDIOGRAM REPORT: CPT | Performed by: INTERNAL MEDICINE

## 2020-11-14 PROCEDURE — 6370000000 HC RX 637 (ALT 250 FOR IP): Performed by: FAMILY MEDICINE

## 2020-11-14 PROCEDURE — 6360000002 HC RX W HCPCS: Performed by: HOSPITALIST

## 2020-11-14 PROCEDURE — 2500000003 HC RX 250 WO HCPCS: Performed by: FAMILY MEDICINE

## 2020-11-14 RX ORDER — LEVOFLOXACIN 5 MG/ML
750 INJECTION, SOLUTION INTRAVENOUS
Status: DISCONTINUED | OUTPATIENT
Start: 2020-11-16 | End: 2020-11-14

## 2020-11-14 RX ORDER — INSULIN GLARGINE 100 [IU]/ML
10 INJECTION, SOLUTION SUBCUTANEOUS DAILY
Status: DISCONTINUED | OUTPATIENT
Start: 2020-11-14 | End: 2020-11-20 | Stop reason: HOSPADM

## 2020-11-14 RX ADMIN — IPRATROPIUM BROMIDE AND ALBUTEROL SULFATE 1 AMPULE: .5; 3 SOLUTION RESPIRATORY (INHALATION) at 10:15

## 2020-11-14 RX ADMIN — IPRATROPIUM BROMIDE AND ALBUTEROL SULFATE 1 AMPULE: .5; 3 SOLUTION RESPIRATORY (INHALATION) at 06:33

## 2020-11-14 RX ADMIN — DILTIAZEM HYDROCHLORIDE 30 MG: 30 TABLET, FILM COATED ORAL at 10:05

## 2020-11-14 RX ADMIN — PIPERACILLIN AND TAZOBACTAM 3.38 G: 3; .375 INJECTION, POWDER, LYOPHILIZED, FOR SOLUTION INTRAVENOUS at 08:34

## 2020-11-14 RX ADMIN — PIPERACILLIN AND TAZOBACTAM 3.38 G: 3; .375 INJECTION, POWDER, LYOPHILIZED, FOR SOLUTION INTRAVENOUS at 17:25

## 2020-11-14 RX ADMIN — INSULIN LISPRO 2 UNITS: 100 INJECTION, SOLUTION INTRAVENOUS; SUBCUTANEOUS at 17:29

## 2020-11-14 RX ADMIN — DILTIAZEM HYDROCHLORIDE 7.5 MG/HR: 5 INJECTION INTRAVENOUS at 08:13

## 2020-11-14 RX ADMIN — Medication 1000 MG: at 10:05

## 2020-11-14 RX ADMIN — INSULIN LISPRO 3 UNITS: 100 INJECTION, SOLUTION INTRAVENOUS; SUBCUTANEOUS at 09:18

## 2020-11-14 RX ADMIN — LEVOFLOXACIN 750 MG: 5 INJECTION, SOLUTION INTRAVENOUS at 08:35

## 2020-11-14 RX ADMIN — Medication 10 UNITS: at 10:05

## 2020-11-14 RX ADMIN — Medication 10 ML: at 08:34

## 2020-11-14 RX ADMIN — INSULIN LISPRO 1 UNITS: 100 INJECTION, SOLUTION INTRAVENOUS; SUBCUTANEOUS at 20:55

## 2020-11-14 RX ADMIN — IPRATROPIUM BROMIDE AND ALBUTEROL SULFATE 1 AMPULE: .5; 3 SOLUTION RESPIRATORY (INHALATION) at 14:26

## 2020-11-14 RX ADMIN — IPRATROPIUM BROMIDE AND ALBUTEROL SULFATE 1 AMPULE: .5; 3 SOLUTION RESPIRATORY (INHALATION) at 19:31

## 2020-11-14 RX ADMIN — Medication 10 ML: at 20:54

## 2020-11-14 RX ADMIN — DILTIAZEM HYDROCHLORIDE 30 MG: 30 TABLET, FILM COATED ORAL at 17:25

## 2020-11-14 RX ADMIN — INSULIN LISPRO 5 UNITS: 100 INJECTION, SOLUTION INTRAVENOUS; SUBCUTANEOUS at 12:53

## 2020-11-14 ASSESSMENT — ENCOUNTER SYMPTOMS
EYES NEGATIVE: 1
GASTROINTESTINAL NEGATIVE: 1
RESPIRATORY NEGATIVE: 1
VOMITING: 0
DIARRHEA: 0
SHORTNESS OF BREATH: 0
NAUSEA: 0

## 2020-11-14 ASSESSMENT — PAIN SCALES - GENERAL
PAINLEVEL_OUTOF10: 0
PAINLEVEL_OUTOF10: 0

## 2020-11-14 NOTE — PROGRESS NOTES
Hospitalist Progress Note  11/14/2020 1:17 PM  Subjective:   Admit Date: 11/11/2020  PCP: Joshua Stevens    Chief Complaint: shortness of breath, pleural effusion    Subjective: Patient is improved today, much less short of breath. Rate is controlled on diltiazem 5 mg/hour. Afebrile. History is otherwise unchanged. Cumulative Hospital History:   11-11: Presented overnight from OSH ED where she was taken with shortness of breath. Large pleural effusion on left discovered and they were unable to perform thoracentesis in ED and thus was transferred for this and for pulmonology consult. Patient demented, unable to provide history. Admitted to med/surg on levofloxacin, piperacillin/tazobactam, and vancomycin. Underwent US-guided thoracentesis. Pulmonology consulted. 11-12: Still very confused but seems to be breathing more easily now. Will need to contact family to determine her baseline mental status. 11-13: Nauseated and coughing up small amounts of thick sputum. Feels worse today. Per family, she is sharp at her baseline. Tachycardic with long runs of SVT to 180s. Transferred to PCU with diltiazem bolus and drip ordered. Cardiology consulted. Guaifenesin/dextromethorphan to thin secretions. 11-14: Oral diltiazem, wean from drip. Levofloxacin and vancomycin stopped by pulmonology. Improving, possibly home soon. ROS: 14 point review of systems is negative except as specifically addressed above.     DIET DYSPHAGIA PUREED; Mildly Thick (Nectar)    Intake/Output Summary (Last 24 hours) at 11/14/2020 1317  Last data filed at 11/14/2020 0920  Gross per 24 hour   Intake 847 ml   Output --   Net 847 ml     Medications:   dilTIAZem (CARDIZEM) 125 mg in dextrose 5% 125 mL infusion 7.5 mg/hr (11/14/20 0813)    sodium chloride 75 mL/hr (11/13/20 1154)    dextrose       Current Facility-Administered Medications   Medication Dose Route Frequency Provider Last Rate Last Dose    dilTIAZem (CARDIZEM) tablet 30 mg  30 mg Oral 4 times per day Alessio Marvin, DO   30 mg at 11/14/20 1005    insulin glargine (LANTUS) injection vial 10 Units  10 Units Subcutaneous Daily [facility], DO   10 Units at 11/14/20 1005    piperacillin-tazobactam (ZOSYN) 3.375 g in dextrose 5 % 50 mL IVPB extended infusion (mini-bag)  3.375 g Intravenous Q8H Burgess Smith MD        dilTIAZem 125 mg in dextrose 5 % 125 mL infusion  5 mg/hr Intravenous Continuous [facility], DO 7.5 mL/hr at 11/14/20 0813 7.5 mg/hr at 11/14/20 0813    guaiFENesin-dextromethorphan (ROBITUSSIN DM) 100-10 MG/5ML syrup 5 mL  5 mL Oral Q4H [facility]N [facility], DO        0.9 % sodium chloride infusion   Intravenous Continuous Burgess Smith MD 75 mL/hr at 11/13/20 1154 75 mL/hr at 11/13/20 1154    sodium chloride flush 0.9 % injection 10 mL  10 mL Intravenous 2 times per day Burgess Smith MD   10 mL at 11/14/20 0834    sodium chloride flush 0.9 % injection 10 mL  10 mL Intravenous PRN Burgess Smith MD        acetaminophen (TYLENOL) tablet 650 mg  650 mg Oral Q6H PRN Burgess Smith MD   650 mg at 11/11/20 1948    Or    acetaminophen (TYLENOL) suppository 650 mg  650 mg Rectal Q6H PRN Burgess Smith MD        polyethylene glycol (GLYCOLAX) packet 17 g  17 g Oral Daily PRN Burgess Smith MD        promethazine (PHENERGAN) tablet 12.5 mg  12.5 mg Oral Q6H PRN Burgess Smith MD        Or    ondansetron [facility]) injection 4 mg  4 mg Intravenous Q6H PRN Burgess Smith MD   4 mg at 11/12/20 1053    ipratropium-albuterol (DUONEB) nebulizer solution 1 ampule  1 ampule Inhalation Q4H WA Burgess Smith MD   1 ampule at 11/14/20 1015    insulin lispro (HUMALOG) injection vial 0-6 Units  0-6 Units Subcutaneous TID WC Burgess Smith MD   5 Units at 11/14/20 1253    insulin lispro (HUMALOG) injection vial 0-3 Units  0-3 Units Subcutaneous Nightly Burgess Smith MD   2 Units at 11/12/20 7912    glucose (GLUTOSE) 40 % oral gel 15 g  15 g Oral PRN Juanna Saint, MD        dextrose 50 % IV solution  12.5 g Intravenous PRN Juanna Saint, MD        glucagon (rDNA) injection 1 mg  1 mg Intramuscular PRN Juanna Saint, MD        dextrose 5 % solution  100 mL/hr Intravenous PRN Juanna Saint, MD            Labs:     Recent Labs     11/12/20 0316 11/13/20 0140 11/14/20 0216   WBC 12.8* 16.9* 16.9*   RBC 3.14* 3.04* 3.27*   HGB 9.0* 8.7* 9.3*   HCT 27.8* 26.9* 30.1*   MCV 88.5 88.5 92.0   MCH 28.7 28.6 28.4   MCHC 32.4* 32.3* 30.9*    357 392     Recent Labs     11/12/20 0316 11/13/20 0140 11/14/20 0216   * 134* 133*   K 3.9 3.4* 3.4*   ANIONGAP 10 10 17   CL 99 100 96*   CO2 24 24 20*   BUN 10 9 12   CREATININE 0.5 0.5 0.8   GLUCOSE 157* 146* 268*   CALCIUM 8.6* 8.7* 8.9     Recent Labs     11/13/20 0140 11/14/20 0216   MG 1.2* 1.3*     No results for input(s): AST, ALT, ALB, BILITOT, ALKPHOS, ALB in the last 72 hours. ABGs:No results for input(s): PH, PO2, PCO2, HCO3, BE, O2SAT in the last 72 hours. Troponin T:   No results for input(s): TROPONINI in the last 72 hours. INR:   No results for input(s): INR in the last 72 hours. Lactic Acid: No results for input(s): LACTA in the last 72 hours.     Objective:   Vitals: BP (!) 118/55   Pulse 102   Temp 98 °F (36.7 °C) (Temporal)   Resp 16   Ht 5' 7\" (1.702 m)   Wt 123 lb (55.8 kg)   SpO2 91%   BMI 19.26 kg/m²   24HR INTAKE/OUTPUT:      Intake/Output Summary (Last 24 hours) at 11/14/2020 1317  Last data filed at 11/14/2020 0920  Gross per 24 hour   Intake 847 ml   Output --   Net 847 ml     General appearance: alert and cooperative with exam  HEENT: atraumatic, eyes with clear conjunctiva and normal lids, pupils and irises normal, external ears and nose are normal, lips normal  Neck without masses, lympadenopathy, bruit, thyroid normal  Lungs: no increased work of breathing, diminished breath sounds LLL and wheezes bilaterally  Heart: tachycardic, irregular, no murmur  Abdomen: soft, non-tender; bowel sounds normal; no masses,  no organomegaly  Extremities: extremities normal, atraumatic, no cyanosis or edema  Neurologic: normal sensation, alert but very confused, has memorized the year, flattened affect  Skin: no rashes, nodules    Assessment and Plan:   Principal Problem:    Sepsis ([facility])  Active Problems:    Pneumonia    Palliative care patient    Pleural effusion    Hyponatremia    Normocytic anemia    Type 2 diabetes mellitus ([place]raft)    History of stroke with residual deficit    Dementia ([facility])    Hypocalcemia    Essential hypertension    Supraventricular tachycardia ([place] Graft)  Resolved Problems:    * No resolved hospital problems. *      POD #3 thoracentesis. Day #4 piperacillin/tazobactam empiric therapy for pneumonia. Levofloxacin and vancomycin discontinued by pulmonology. Cultures pending to guide therapy. Diltiazem 30 mg four times daily. Wean off drip, increase oral dose as necessary. Pulmonology consulted, appreciate input. Cardiology onboard. Patient's mental status greatly improved today, possibly to baseline.     Advance Directive: DNR-CC    DVT prophylaxis: SCDs    Discharge planning: TBD      [facility], DO  Rounding Hospitalist

## 2020-11-14 NOTE — PROGRESS NOTES
Pharmacy Renal Adjustment    Rachel Walter is a 80 y.o. female. Pharmacy has renally adjusted medications per protocol. Recent Labs     11/13/20  0140 11/14/20  0216   BUN 9 12       Recent Labs     11/13/20  0140 11/14/20  0216   CREATININE 0.5 0.8       Estimated Creatinine Clearance: 42 mL/min (based on SCr of 0.8 mg/dL). Height:   Ht Readings from Last 1 Encounters:   11/11/20 5' 7\" (1.702 m)     Weight:  Wt Readings from Last 1 Encounters:   11/13/20 123 lb (55.8 kg)       Plan: Adjust the following medications based on renal function:  Change Levaquin to 750mg IV q 48 hours.     KAHLIL RANDALL, PHARM D, 11/14/2020, 8:48 AM

## 2020-11-14 NOTE — PROGRESS NOTES
Pulmonary/Critical Care progress Note    Patient Name: Parker Rm  Age/Sex: 80 y.o. female  : 1931  MRN: [id number]    Date Of Admission: 2020  Date of Encounter Visit: 20      Chief Complaints: Pneumonia with parapneumonic effusion    Hospital Course: Came in with pneumonia with large left-sided pleural effusion, had 1 L of exudative effusion drained with improvement in the respiratory status. She is in A. fib with RVR, she is on Cardizem drip and rate is controlled. She is on 2 L nasal cannula oxygen        System Review:   CONSTITUTIONAL: No fever or chills  CARDIOVASCULAR: No chest pain, chest pressure or chest discomfort, she denies any palpitation, has no edema  RESPIRATORY: Improved shortness of breath, no cough   GI: No anorexia, nausea, vomiting or diarrhea. No abdominal pain or bleeding. HEMATOLOGIC: No bleeding or bruising    Vital Signs:  Blood pressure (!) 118/55, pulse 102, temperature 98 °F (36.7 °C), temperature source Temporal, resp. rate 16, height 5' 7\" (1.702 m), weight 123 lb (55.8 kg), SpO2 91 %. Intake/Output Summary (Last 24 hours) at 2020 1023  Last data filed at 2020 0920  Gross per 24 hour   Intake 847 ml   Output --   Net 847 ml     Body mass index is 19.26 kg/m². Physical exam  GENERAL APPEARANCE: Hard of hearing elderly lady, sickly looking, but in no acute distress, confused  HEENT: Eyes are symmetrical and pupils are reactive, no scleral icterus or scleral edema, no discharge from the eyes, external ears and nose normal, moist mucous membrane, no thrush  PULMONARY: Normal chest on inspection, improved breath sounds on the left side compared to prior reported examination, breathing is nonlabored, on 2 L nasal cannula oxygen  CARDIAC: Rate controlled, irregular, normal S1-S2, no gallop, positive soft murmur .   ABDOMEN: Soft, nontender, positive bowel sounds, no hepatospleno megaly, no rebound, no distention  SKIN: Warm and dry, no rashes or skin lesion, no skin palpable nodules, good turgor pressure  EXTREMITIES: palpable pulses distally with no cyanosis or clubbing, no lower extremities edema  NEURO: Moving all extremities, able to follow commands, strength 5/5 and symmetrical, intact sensation      LABS  Recent Labs     11/12/20  0316 11/13/20  0140 11/14/20  0216   WBC 12.8* 16.9* 16.9*   RBC 3.14* 3.04* 3.27*   HGB 9.0* 8.7* 9.3*   HCT 27.8* 26.9* 30.1*    357 392   MCV 88.5 88.5 92.0   MCH 28.7 28.6 28.4   MCHC 32.4* 32.3* 30.9*   RDW 13.2 13.3 13.5      Recent Labs     11/12/20  0316  11/13/20  0140 11/13/20  1226 11/14/20  0216   *  --  134*  --  133*   K 3.9  --  3.4*  --  3.4*   CL 99  --  100  --  96*   CO2 24  --  24  --  20*   BUN 10  --  9  --  12   CREATININE 0.5  --  0.5  --  0.8   CALCIUM 8.6*  --  8.7*  --  8.9   GLUCOSE 157*  --  146*  --  268*   MG  --    < > 1.2*  --  1.3*   TSH  --   --   --  1.700  --     < > = values in this interval not displayed. Recent Labs     11/11/20  1500   LABGRAM Many WBC's (Polymorphonuclear) present  No organisms seen             Imaging:       Allergies  Allergies   Allergen Reactions    Erythromycin     Niacin And Related     Penicillins     Sulfa Antibiotics      Medications    dilTIAZem, 30 mg, Oral, 4 times per day    insulin glargine, 10 Units, Subcutaneous, Daily    piperacillin-tazobactam, 3.375 g, Intravenous, Q8H **AND** [START ON 11/16/2020] levofloxacin, 750 mg, Intravenous, Q48H    sodium chloride flush, 10 mL, Intravenous, 2 times per day    ipratropium-albuterol, 1 ampule, Inhalation, Q4H WA    insulin lispro, 0-6 Units, Subcutaneous, TID WC    insulin lispro, 0-3 Units, Subcutaneous, Nightly    vancomycin (VANCOCIN) intermittent dosing (placeholder), , Other, RX Placeholder    vancomycin, 1,000 mg, Intravenous, Q24H    dilTIAZem (CARDIZEM) 125 mg in dextrose 5% 125 mL infusion 7.5 mg/hr (11/14/20 0813)    sodium chloride 75 mL/hr (11/13/20 1154)    dextrose ASSESSMENT   1. Acute hypoxic respiratory failure, stable to improved  2. Large left-sided pleural effusion  3. Status post ultrasound-guided thoracentesis  4. Possible heart failure versus parapneumonic effusion  5. Community-acquired pneumonia  6. Hypertension  7. Hyperlipidemia  8. Diabetes mellitus  9. History of stroke  10. Dysphagia    PLAN   Patient was resting comfortably this morning, her oxygen requirements are currently at 2 L/min. Seems to be clinically better post thoracentesis, so far results are consistent with parapneumonic effusion  Would like to have a follow-up chest x-ray  to be used as a reference in case of reaccumulation of fluid.   Recommend to start de-escalating antibiotics, we will discontinue the vancomycin and Levaquin and continue with the Zosyn with the plan to transition to an oral regimen if she continues to improve, we will do the MRSA nasal swab and consider going back on the vancomycin only if positive    Ashli Reyes  11/14/20  10:23 AM    Dictated Using Dragon Dictation

## 2020-11-14 NOTE — CONSULTS
[facility]  Cardiology Consult      Requesting MD:  Etelvina Or, DO   Admit Status:  Inpatient [101]       History obtained from:   [] Patient  [] Other (specify):     Patient:  Ekaterina Flores  [id number]     Chief Complaint: No chief complaint on file. HPI: Ms. Merle Cotton is a 80 y.o. female with a history of dementia transferred to this facility 11/11/2020 with large left pleural effusion possible PNA started on azithromycin and Rocephin. Ultrasound guided thoracentesis ordered pulmonary service consulted. Admitted with suspected sepsis. Had an episode of supraventricular tachycardia cardiology consulted no previous cardiac history per se. Echocardiogram obtained technically difficult study probably normal left ventricular function no significant valvular issues. No reported chest pain. Troponin negative. proBNP level 862. D-dimer 4.64. Review of Systems:  Review of Systems   Constitutional: Negative. Negative for chills, fever and unexpected weight change. HENT: Negative. Eyes: Negative. Respiratory: Negative. Negative for shortness of breath. Cardiovascular: Negative. Negative for chest pain. Gastrointestinal: Negative. Negative for diarrhea, nausea and vomiting. Endocrine: Negative. Genitourinary: Negative. Musculoskeletal: Negative. Skin: Negative. Neurological: Negative. All other systems reviewed and are negative.       Cardiac Specific Data:  Specialty Problems        Cardiology Problems    Essential hypertension        Supraventricular tachycardia [facility])              Past Medical History:  Past Medical History:   Diagnosis Date    Blood circulation, collateral     [facility]erebral [facility] occlusion [facility] infarction [facility]     Diabetes mellitus ([facility]     Hyperlipidemia     Hypertension     Palliative care patient 11/12/2020        Past Surgical History:  Past Surgical History:   Procedure Laterality Date    CHOLECYSTECTOMY         Past Family History:  History reviewed. No pertinent family history. Past Social History:  Social History     Socioeconomic History    Marital status:      Spouse name: Not on file    Number of children: Not on file    Years of education: Not on file    Highest education level: Not on file   Occupational History    Not on file   Social Needs    Financial resource strain: Not on file    Food insecurity     Worry: Not on file     Inability: Not on file    Transportation needs     Medical: Not on file     Non-medical: Not on file   Tobacco Use    Smoking status: Not on file   Substance and Sexual Activity    Alcohol use: Never     Frequency: Never    Drug use: Not on file    Sexual activity: Not on file   Lifestyle    Physical activity     Days per week: Not on file     Minutes per session: Not on file    Stress: Not on file   Relationships    Social connections     Talks on phone: Not on file     Gets together: Not on file     Attends Faith service: Not on file     Active member of club or organization: Not on file     Attends meetings of clubs or organizations: Not on file     Relationship status: Not on file    Intimate partner violence     Fear of current or ex partner: Not on file     Emotionally abused: Not on file     Physically abused: Not on file     Forced sexual activity: Not on file   Other Topics Concern    Not on file   Social History Narrative    Not on file       Allergies: Allergies   Allergen Reactions    Erythromycin     Niacin And Related     Penicillins     Sulfa Antibiotics        Home Meds:  Prior to Admission medications    Medication Sig Start Date End Date Taking?  Authorizing Provider   aspirin EC 81 MG EC tablet Take 81 mg by mouth daily   Yes Historical Provider, MD   Multiple Vitamins-Minerals ([address] 50+) CAPS Take by mouth   Yes Historical Provider, MD   Cholecalciferol (VITAMIN D3) 50 MCG (2000 UT) CAPS Take by mouth   Yes Historical Provider, MD glimepiride (AMARYL) 4 MG tablet Take 4 mg by mouth every morning (before breakfast)   Yes Historical Provider, MD   latanoprost (XALATAN) 0.005 % ophthalmic solution 1 drop nightly   Yes Historical Provider, MD   lisinopril (PRINIVIL;ZESTRIL) 10 MG tablet Take 10 mg by mouth daily   Yes Historical Provider, MD   lovastatin (MEVACOR) 10 MG tablet Take 10 mg by mouth nightly   Yes Historical Provider, MD   metFORMIN (GLUCOPHAGE) 1000 MG tablet Take 1,000 mg by mouth 2 times daily (with meals)   Yes Historical Provider, MD       Current Meds:   dilTIAZem  30 mg Oral 4 times per day    insulin glargine  10 Units Subcutaneous Daily    piperacillin-tazobactam  3.375 g Intravenous Q8H    sodium chloride flush  10 mL Intravenous 2 times per day    ipratropium-albuterol  1 ampule Inhalation Q4H WA    insulin lispro  0-6 Units Subcutaneous TID WC    insulin lispro  0-3 Units Subcutaneous Nightly       Current Infused Meds:   dilTIAZem (CARDIZEM) 125 mg in dextrose 5% 125 mL infusion 7.5 mg/hr (11/14/20 0813)    sodium chloride 75 mL/hr (11/13/20 1154)    dextrose         Physical Exam:  Vitals:    11/14/20 0708   BP: (!) 118/55   Pulse: 102   Resp: 16   Temp: 98 °F (36.7 °C)   SpO2: 91%       Intake/Output Summary (Last 24 hours) at 11/14/2020 1039  Last data filed at 11/14/2020 0920  Gross per 24 hour   Intake 847 ml   Output --   Net 847 ml     Estimated body mass index is 19.26 kg/m² as calculated from the following:    Height as of this encounter: 5' 7\" (1.702 m). Weight as of this encounter: 123 lb (55.8 kg). Physical Exam  Vitals signs reviewed. Constitutional:       General: She is not in acute distress. Appearance: She is well-developed. She is not ill-appearing, toxic-appearing or diaphoretic. HENT:      Head: Normocephalic and atraumatic. Nose: Nose normal.      Mouth/Throat:      Mouth: Mucous membranes are moist.      Pharynx: Oropharynx is clear.    Eyes:      General: No scleral icterus. Extraocular Movements: Extraocular movements intact. Pupils: Pupils are equal, round, and reactive to light. Neck:      Musculoskeletal: Normal range of motion and neck supple. No neck rigidity or muscular tenderness. Vascular: No carotid bruit or JVD. Cardiovascular:      Rate and Rhythm: Normal rate and regular rhythm. Heart sounds: Normal heart sounds. No murmur. No friction rub. No gallop. Pulmonary:      Effort: Pulmonary effort is normal. No respiratory distress. Breath sounds: Normal breath sounds. No stridor. No wheezing, rhonchi or rales. Chest:      Chest wall: No tenderness. Abdominal:      General: There is no distension. Palpations: There is no mass. Tenderness: There is no abdominal tenderness. There is no right CVA tenderness, left CVA tenderness, guarding or rebound. Hernia: No hernia is present. Musculoskeletal:         General: No swelling, tenderness, deformity or signs of injury. Right lower leg: No edema. Left lower leg: No edema. Lymphadenopathy:      Cervical: No cervical adenopathy. Skin:     General: Skin is warm and dry. Neurological:      General: No focal deficit present. Mental Status: She is alert. Mental status is at baseline. Cranial Nerves: No cranial nerve deficit. Sensory: No sensory deficit. Motor: No weakness. Coordination: Coordination normal.   Psychiatric:         Mood and Affect: Mood normal.         Behavior: Behavior normal.         Thought Content:  Thought content normal.         Judgment: Judgment normal.         Labs:  Recent Labs     11/12/20 0316 11/13/20 0140 11/14/20 0216   WBC 12.8* 16.9* 16.9*   HGB 9.0* 8.7* 9.3*    357 392       Recent Labs     11/12/20 0316 11/13/20 0140 11/14/20 0216   * 134* 133*   K 3.9 3.4* 3.4*   CL 99 100 96*   CO2 24 24 20*   BUN 10 9 12   CREATININE 0.5 0.5 0.8   LABGLOM >60 >60 >60   MG  --  1.2* 1.3* CALCIUM 8.6* 8.7* 8.9       CK, CKMB, Troponin: @LABRCNT (CKTOTAL:3, CKMB:3, TROPONINI:3)@    Last 3 BNP:  No results for input(s): BNP in the last 72 hours. IMAGING:  Xr Chest Portable    Result Date: 11/11/2020  Examination. XR CHEST PORTABLE 11/11/2020 8:01 AM History: Shortness of breath. A single frontal portable upright view of the chest is obtained. There is no previous study for comparison. There is a left lower lung consolidation and a large pleural effusion, free and loculated, in the left chest extending into the left apex. The right lung is poorly distended. No obvious abnormality, infiltrate or consolidation. The Heart size is not evaluated in this study. The distal trachea is displaced towards the right which is probably due to a tortuous and atheromatous thoracic aorta. There is no pneumothorax. Consolidation and pleural effusion in the left chest. The right lung is unremarkable. Signed by Dr Julita Rome on 11/11/2020 3:41 PM    Us Thoracentesis    Result Date: 11/11/2020  EXAM: US THORACENTESIS -- 11/11/2020 1:12 PM HISTORY: 80 years, Female, left pleural effusion, pneumonia on chart review. COMPARISON: No existing relevant imaging studies available PROCEDURE: Risks, benefits and alternatives to the procedure were discussed with the patient. Written informed consent obtained. Time Out: Immediately prior to the procedure, a time out was performed. The patient's name, date of birth, and procedure (including laterality) to be performed were verified. Using ultrasound guidance, a site in the left posterior chest was selected and prepped in a sterile fashion. Lidocaine was used for local anesthesia. A 4 Pashto one-step catheter was inserted into the left pleural space. 1000 mL of serosanguineous fluid was withdrawn. The catheter was then withdrawn. The fluid was sent to the laboratory for analysis. The patient tolerated the procedure well. No evidence of complication.     Successful ultrasound-guided diagnostic and therapeutic left thoracentesis with drainage of 1000 mL of serous sanguinous pleural fluid. Signed by Dr Sania Jean on 11/11/2020 4:11 PM      Assessment:  1. Suspected sepsis  2. Supraventricular tachycardia  3. Dementia  4. Cerebral artery occlusion with cerebral infarction  5. Diabetes mellitus  6. Hyperlipidemia  7. Hypertension  8. Severe sensorineural hearing loss  9. Pleural effusion  10. Anemia  11. Hyponatremia  12. Hypocalcemia  13. Echocardiogram 11/11/2020 normal left ventricular function but suboptimal study no valvular regurgitation aortic valve sclerosis  14. Fattening 1.3  15. Leukocytosis      Recommendations:  1.  Agree with diltiazem changed to oral diltiazem this morning 30 mg every 6 hours will follow with you

## 2020-11-15 ENCOUNTER — APPOINTMENT (OUTPATIENT)
Dept: GENERAL RADIOLOGY | Age: 85
DRG: 871 | End: 2020-11-15
Attending: HOSPITALIST
Payer: MEDICARE

## 2020-11-15 ENCOUNTER — OUTSIDE FACILITY SERVICE (OUTPATIENT)
Dept: PULMONOLOGY | Facility: CLINIC | Age: 85
End: 2020-11-15

## 2020-11-15 ENCOUNTER — APPOINTMENT (OUTPATIENT)
Dept: ULTRASOUND IMAGING | Age: 85
DRG: 871 | End: 2020-11-15
Attending: HOSPITALIST
Payer: MEDICARE

## 2020-11-15 LAB
ANION GAP SERPL CALCULATED.3IONS-SCNC: 13 MMOL/L (ref 7–19)
APTT: 39.2 SEC (ref 26–36.2)
BUN BLDV-MCNC: 15 MG/DL (ref 8–23)
CALCIUM SERPL-MCNC: 9.3 MG/DL (ref 8.8–10.2)
CHLORIDE BLD-SCNC: 101 MMOL/L (ref 98–111)
CO2: 23 MMOL/L (ref 22–29)
CREAT SERPL-MCNC: 0.8 MG/DL (ref 0.5–0.9)
GFR AFRICAN AMERICAN: >59
GFR NON-AFRICAN AMERICAN: >60
GLUCOSE BLD-MCNC: 166 MG/DL (ref 70–99)
GLUCOSE BLD-MCNC: 170 MG/DL (ref 74–109)
GLUCOSE BLD-MCNC: 240 MG/DL (ref 70–99)
GLUCOSE BLD-MCNC: 248 MG/DL (ref 70–99)
HCT VFR BLD CALC: 29.3 % (ref 37–47)
HEMOGLOBIN: 9.2 G/DL (ref 12–16)
INR BLD: 1.5 (ref 0.88–1.18)
MAGNESIUM: 1.4 MG/DL (ref 1.6–2.4)
MCH RBC QN AUTO: 28.3 PG (ref 27–31)
MCHC RBC AUTO-ENTMCNC: 31.4 G/DL (ref 33–37)
MCV RBC AUTO: 90.2 FL (ref 81–99)
PDW BLD-RTO: 13.5 % (ref 11.5–14.5)
PERFORMED ON: ABNORMAL
PLATELET # BLD: 417 K/UL (ref 130–400)
PMV BLD AUTO: 9.7 FL (ref 9.4–12.3)
POTASSIUM REFLEX MAGNESIUM: 3.1 MMOL/L (ref 3.5–5)
PROTHROMBIN TIME: 18.2 SEC (ref 12–14.6)
RBC # BLD: 3.25 M/UL (ref 4.2–5.4)
SODIUM BLD-SCNC: 137 MMOL/L (ref 136–145)
WBC # BLD: 14.7 K/UL (ref 4.8–10.8)

## 2020-11-15 PROCEDURE — 87075 CULTR BACTERIA EXCEPT BLOOD: CPT

## 2020-11-15 PROCEDURE — 99233 SBSQ HOSP IP/OBS HIGH 50: CPT | Performed by: INTERNAL MEDICINE

## 2020-11-15 PROCEDURE — 2140000000 HC CCU INTERMEDIATE R&B

## 2020-11-15 PROCEDURE — 36415 COLL VENOUS BLD VENIPUNCTURE: CPT

## 2020-11-15 PROCEDURE — 82947 ASSAY GLUCOSE BLOOD QUANT: CPT

## 2020-11-15 PROCEDURE — 94640 AIRWAY INHALATION TREATMENT: CPT

## 2020-11-15 PROCEDURE — 0W9B3ZZ DRAINAGE OF LEFT PLEURAL CAVITY, PERCUTANEOUS APPROACH: ICD-10-PCS | Performed by: RADIOLOGY

## 2020-11-15 PROCEDURE — 83735 ASSAY OF MAGNESIUM: CPT

## 2020-11-15 PROCEDURE — 85610 PROTHROMBIN TIME: CPT

## 2020-11-15 PROCEDURE — 2700000000 HC OXYGEN THERAPY PER DAY

## 2020-11-15 PROCEDURE — 6370000000 HC RX 637 (ALT 250 FOR IP): Performed by: HOSPITALIST

## 2020-11-15 PROCEDURE — 6370000000 HC RX 637 (ALT 250 FOR IP): Performed by: FAMILY MEDICINE

## 2020-11-15 PROCEDURE — 80048 BASIC METABOLIC PNL TOTAL CA: CPT

## 2020-11-15 PROCEDURE — 71046 X-RAY EXAM CHEST 2 VIEWS: CPT

## 2020-11-15 PROCEDURE — 87015 SPECIMEN INFECT AGNT CONCNTJ: CPT

## 2020-11-15 PROCEDURE — 71045 X-RAY EXAM CHEST 1 VIEW: CPT

## 2020-11-15 PROCEDURE — 2580000003 HC RX 258: Performed by: HOSPITALIST

## 2020-11-15 PROCEDURE — 2580000003 HC RX 258: Performed by: FAMILY MEDICINE

## 2020-11-15 PROCEDURE — 32555 ASPIRATE PLEURA W/ IMAGING: CPT

## 2020-11-15 PROCEDURE — 87205 SMEAR GRAM STAIN: CPT

## 2020-11-15 PROCEDURE — 85730 THROMBOPLASTIN TIME PARTIAL: CPT

## 2020-11-15 PROCEDURE — 85027 COMPLETE CBC AUTOMATED: CPT

## 2020-11-15 PROCEDURE — 6360000002 HC RX W HCPCS: Performed by: HOSPITALIST

## 2020-11-15 PROCEDURE — 87070 CULTURE OTHR SPECIMN AEROBIC: CPT

## 2020-11-15 PROCEDURE — 2500000003 HC RX 250 WO HCPCS: Performed by: FAMILY MEDICINE

## 2020-11-15 PROCEDURE — 93005 ELECTROCARDIOGRAM TRACING: CPT | Performed by: INTERNAL MEDICINE

## 2020-11-15 RX ORDER — POTASSIUM CHLORIDE 7.45 MG/ML
10 INJECTION INTRAVENOUS PRN
Status: DISCONTINUED | OUTPATIENT
Start: 2020-11-15 | End: 2020-11-20 | Stop reason: HOSPADM

## 2020-11-15 RX ORDER — MAGNESIUM SULFATE 1 G/100ML
1 INJECTION INTRAVENOUS PRN
Status: DISCONTINUED | OUTPATIENT
Start: 2020-11-15 | End: 2020-11-20 | Stop reason: HOSPADM

## 2020-11-15 RX ORDER — POTASSIUM CHLORIDE 20 MEQ/1
40 TABLET, EXTENDED RELEASE ORAL PRN
Status: DISCONTINUED | OUTPATIENT
Start: 2020-11-15 | End: 2020-11-20 | Stop reason: HOSPADM

## 2020-11-15 RX ADMIN — IPRATROPIUM BROMIDE AND ALBUTEROL SULFATE 1 AMPULE: .5; 3 SOLUTION RESPIRATORY (INHALATION) at 14:56

## 2020-11-15 RX ADMIN — DILTIAZEM HYDROCHLORIDE 30 MG: 30 TABLET, FILM COATED ORAL at 00:29

## 2020-11-15 RX ADMIN — DILTIAZEM HYDROCHLORIDE 5 MG/HR: 5 INJECTION INTRAVENOUS at 06:15

## 2020-11-15 RX ADMIN — IPRATROPIUM BROMIDE AND ALBUTEROL SULFATE 1 AMPULE: .5; 3 SOLUTION RESPIRATORY (INHALATION) at 06:43

## 2020-11-15 RX ADMIN — POTASSIUM BICARBONATE 40 MEQ: 782 TABLET, EFFERVESCENT ORAL at 06:15

## 2020-11-15 RX ADMIN — DILTIAZEM HYDROCHLORIDE 30 MG: 30 TABLET, FILM COATED ORAL at 06:15

## 2020-11-15 RX ADMIN — IPRATROPIUM BROMIDE AND ALBUTEROL SULFATE 1 AMPULE: .5; 3 SOLUTION RESPIRATORY (INHALATION) at 19:29

## 2020-11-15 RX ADMIN — Medication 10 ML: at 20:25

## 2020-11-15 RX ADMIN — INSULIN LISPRO 2 UNITS: 100 INJECTION, SOLUTION INTRAVENOUS; SUBCUTANEOUS at 17:04

## 2020-11-15 RX ADMIN — INSULIN LISPRO 1 UNITS: 100 INJECTION, SOLUTION INTRAVENOUS; SUBCUTANEOUS at 20:26

## 2020-11-15 RX ADMIN — DILTIAZEM HYDROCHLORIDE 30 MG: 30 TABLET, FILM COATED ORAL at 14:02

## 2020-11-15 RX ADMIN — PIPERACILLIN AND TAZOBACTAM 3.38 G: 3; .375 INJECTION, POWDER, LYOPHILIZED, FOR SOLUTION INTRAVENOUS at 00:29

## 2020-11-15 RX ADMIN — DILTIAZEM HYDROCHLORIDE 30 MG: 30 TABLET, FILM COATED ORAL at 16:59

## 2020-11-15 RX ADMIN — PIPERACILLIN AND TAZOBACTAM 3.38 G: 3; .375 INJECTION, POWDER, LYOPHILIZED, FOR SOLUTION INTRAVENOUS at 16:59

## 2020-11-15 RX ADMIN — PIPERACILLIN AND TAZOBACTAM 3.38 G: 3; .375 INJECTION, POWDER, LYOPHILIZED, FOR SOLUTION INTRAVENOUS at 23:25

## 2020-11-15 RX ADMIN — PIPERACILLIN AND TAZOBACTAM 3.38 G: 3; .375 INJECTION, POWDER, LYOPHILIZED, FOR SOLUTION INTRAVENOUS at 09:15

## 2020-11-15 RX ADMIN — Medication 10 UNITS: at 09:15

## 2020-11-15 NOTE — PROGRESS NOTES
Hospitalist Progress Note  11/15/2020 1:03 PM  Subjective:   Admit Date: 11/11/2020  PCP: Valentino Woodard    Chief Complaint: shortness of breath, pleural effusion    Subjective: More short of breath and lethargic today. Rate is controlled. History is otherwise unchanged. Cumulative Hospital History:   11-11: Presented overnight from OSH ED where she was taken with shortness of breath. Large pleural effusion on left discovered and they were unable to perform thoracentesis in ED and thus was transferred for this and for pulmonology consult. Patient demented, unable to provide history. Admitted to med/surg on levofloxacin, piperacillin/tazobactam, and vancomycin. Underwent US-guided thoracentesis. Pulmonology consulted. 11-12: Still very confused but seems to be breathing more easily now. Will need to contact family to determine her baseline mental status. 11-13: Nauseated and coughing up small amounts of thick sputum. Feels worse today. Per family, she is sharp at her baseline. Tachycardic with long runs of SVT to 180s. Transferred to PCU with diltiazem bolus and drip ordered. Cardiology consulted. Guaifenesin/dextromethorphan to thin secretions. 11-14: Oral diltiazem, wean from drip. Levofloxacin and vancomycin stopped by pulmonology. Improving, possibly home soon. 11-15: Significantly increased left pleural effusion, repeat thoracentesis done with 900 mL removed. More short of breath and lethargic today. Discussed with pulmonology, will be started on steroids. Possibility that she will need VATS. ROS: 14 point review of systems is negative except as specifically addressed above.     DIET DYSPHAGIA PUREED; Mildly Thick (Nectar)    Intake/Output Summary (Last 24 hours) at 11/15/2020 1303  Last data filed at 11/14/2020 1427  Gross per 24 hour   Intake 0 ml   Output --   Net 0 ml     Medications:   dilTIAZem (CARDIZEM) 125 mg in dextrose 5% 125 mL infusion 5 mg/hr (11/15/20 0615)    sodium chloride 75 mL/hr (11/13/20 1154)    dextrose       Current Facility-Administered Medications   Medication Dose Route Frequency Provider Last Rate Last Dose    potassium chloride (KLOR-CON M) extended release tablet 40 mEq  40 mEq Oral PRN Munira Obrien MD        Or    potassium bicarb-citric acid (EFFER-K) effervescent tablet 40 mEq  40 mEq Oral PRN Munira Obrien MD   40 mEq at 11/15/20 0615    Or    potassium chloride 10 mEq/100 mL IVPB (Peripheral Line)  10 mEq Intravenous PRN Munira Obrien MD        magnesium sulfate 1 g in dextrose 5% 100 mL IVPB  1 g Intravenous PRN Munira Obrien MD        dilTIAZem (CARDIZEM) tablet 30 mg  30 mg Oral 4 times per day Celia Lester, DO   30 mg at 11/15/20 0615    insulin glargine (LANTUS) injection vial 10 Units  10 Units Subcutaneous Daily [facility], DO   10 Units at 11/15/20 0915    piperacillin-tazobactam (ZOSYN) 3.375 g in dextrose 5 % 50 mL IVPB extended infusion (mini-bag)  3.375 g Intravenous Q8H Clay Dash MD 12.5 mL/hr at 11/15/20 0915 3.375 g at 11/15/20 0915    dilTIAZem 125 mg in dextrose 5 % 125 mL infusion  5 mg/hr Intravenous Continuous [facility], DO 5 mL/hr at 11/15/20 0615 5 mg/hr at 11/15/20 0615    guaiFENesin-dextromethorphan (ROBITUSSIN DM) 100-10 MG/5ML syrup 5 mL  5 mL Oral Q4H PRN Mohini Oh Thorpe, DO        0.9 % sodium chloride infusion   Intravenous Continuous Clay Dash MD 75 mL/hr at 11/13/20 1154 75 mL/hr at 11/13/20 1154    sodium chloride flush 0.9 % injection 10 mL  10 mL Intravenous 2 times per day Clay Dash MD   10 mL at 11/14/20 2054    sodium chloride flush 0.9 % injection 10 mL  10 mL Intravenous PRN Clay Dash MD        acetaminophen (TYLENOL) tablet 650 mg  650 mg Oral Q6H PRN Clay Dash MD   650 mg at 11/11/20 1948    Or    acetaminophen (TYLENOL) suppository 650 mg  650 mg Rectal Q6H PRN Clay Dash MD        polyethylene glycol (GLYCOLAX) packet 17 g  17 g Oral Daily PRN Kanu Au MD        promethazine (PHENERGAN) tablet 12.5 mg  12.5 mg Oral Q6H PRN Kanu Au MD        Or    [facility]ansetron [facility]) injection 4 mg  4 mg Intravenous Q6H PRN Kanu Au MD   4 mg at 11/12/20 1053    ipratropium-albuterol (DUONEB) nebulizer solution 1 ampule  1 ampule Inhalation Q4H WA Kanu Au MD   1 ampule at 11/15/20 6335    insulin lispro (HUMALOG) injection vial 0-6 Units  0-6 Units Subcutaneous TID WC Kanu Au MD   2 Units at 11/14/20 1729    insulin lispro (HUMALOG) injection vial 0-3 Units  0-3 Units Subcutaneous Nightly Kanu Au MD   1 Units at 11/14/20 2055    glucose (GLUTOSE) 40 % oral gel 15 g  15 g Oral PRN Kanu Au MD        dextrose 50 % IV solution  12.5 g Intravenous PRN Kanu Au MD        glucagon (rDNA) injection 1 mg  1 mg Intramuscular PRN Kanu Au MD        dextrose 5 % solution  100 mL/hr Intravenous PRN Kanu Au MD            Labs:     Recent Labs     11/13/20  0140 11/14/20  0216 11/15/20  0213   WBC 16.9* 16.9* 14.7*   RBC 3.04* 3.27* 3.25*   HGB 8.7* 9.3* 9.2*   HCT 26.9* 30.1* 29.3*   MCV 88.5 92.0 90.2   MCH 28.6 28.4 28.3   MCHC 32.3* 30.9* 31.4*    392 417*     Recent Labs     11/13/20  0140 11/14/20  0216 11/15/20  0213   * 133* 137   K 3.4* 3.4* 3.1*   ANIONGAP 10 17 13    96* 101   CO2 24 20* 23   BUN 9 12 15   CREATININE 0.5 0.8 0.8   GLUCOSE 146* 268* 170*   CALCIUM 8.7* 8.9 9.3     Recent Labs     11/13/20  0140 11/14/20  0216 11/15/20  0213   MG 1.2* 1.3* 1.4*     No results for input(s): AST, ALT, ALB, BILITOT, ALKPHOS, ALB in the last 72 hours. ABGs:No results for input(s): PH, PO2, PCO2, HCO3, BE, O2SAT in the last 72 hours. Troponin T:   No results for input(s): TROPONINI in the last 72 hours.   INR:   Recent Labs     11/15/20  0944   INR 1.50*     Lactic Acid: No results for input(s): LACTA in the last 72 hours. Objective:   Vitals: /68   Pulse 78   Temp 97.4 °F (36.3 °C) (Temporal)   Resp 16   Ht 5' 7\" (1.702 m)   Wt 123 lb (55.8 kg)   SpO2 92%   BMI 19.26 kg/m²   24HR INTAKE/OUTPUT:      Intake/Output Summary (Last 24 hours) at 11/15/2020 1303  Last data filed at 11/14/2020 1427  Gross per 24 hour   Intake 0 ml   Output --   Net 0 ml     General appearance: alert and cooperative with exam  HEENT: atraumatic, eyes with clear conjunctiva and normal lids, pupils and irises normal, external ears and nose are normal, lips normal  Neck without masses, lympadenopathy, bruit, thyroid normal  Lungs: no increased work of breathing, diminished breath sounds LLL and wheezes bilaterally  Heart: irregularly irregular, no murmur  Abdomen: soft, non-tender; bowel sounds normal; no masses,  no organomegaly  Extremities: extremities normal, atraumatic, no cyanosis or edema  Neurologic: normal sensation, alert but very confused, has memorized the year, flattened affect  Skin: no rashes, nodules    Assessment and Plan:   Principal Problem:    Sepsis ([address]  Active Problems:    Pneumonia    Palliative care patient    Pleural effusion    Hyponatremia    Normocytic anemia    Type 2 diabetes mellitus (HCC)    History of stroke with residual deficit    Dementia (HCC)    Hypocalcemia    Essential hypertension    Supraventricular tachycardia ([address]  Resolved Problems:    * No resolved hospital problems. *      POD #4 and #0 thoracenteses. Day #5 piperacillin/tazobactam empiric therapy for pneumonia. Levofloxacin and vancomycin discontinued by pulmonology. Cultures pending to guide therapy. Rate controlled on oral diltiazem. Pulmonology consulted, appreciate input. Starting steroids today, may need VATS. Cardiology onboard.     Advance Directive: DNR-CC    DVT prophylaxis: SCDs    [facility]

## 2020-11-15 NOTE — PROGRESS NOTES
normal S1-S2, no gallop, positive soft murmur . ABDOMEN: Soft, nontender, positive bowel sounds, no hepatospleno megaly, no rebound, no distention  SKIN: Warm and dry, no rashes or skin lesion, no skin palpable nodules, good turgor pressure  EXTREMITIES: palpable pulses distally with no cyanosis or clubbing, no lower extremities edema  NEURO: Moving all extremities, able to follow commands, strength 5/5 and symmetrical, intact sensation      LABS  Recent Labs     11/13/20  0140 11/14/20  0216 11/15/20  0213   WBC 16.9* 16.9* 14.7*   RBC 3.04* 3.27* 3.25*   HGB 8.7* 9.3* 9.2*   HCT 26.9* 30.1* 29.3*    392 417*   MCV 88.5 92.0 90.2   MCH 28.6 28.4 28.3   MCHC 32.3* 30.9* 31.4*   RDW 13.3 13.5 13.5      Recent Labs     11/13/20  0140 11/13/20  1226 11/14/20  0216 11/15/20  0213 11/15/20  0944   *  --  133* 137  --    K 3.4*  --  3.4* 3.1*  --      --  96* 101  --    CO2 24  --  20* 23  --    BUN 9  --  12 15  --    CREATININE 0.5  --  0.8 0.8  --    CALCIUM 8.7*  --  8.9 9.3  --    GLUCOSE 146*  --  268* 170*  --    MG 1.2*  --  1.3* 1.4*  --    INR  --   --   --   --  1.50*   TSH  --  1.700  --   --   --       No results for input(s): BC, LABGRAM, CULTRESP, BFCX in the last 72 hours. Imaging:       Allergies  Allergies   Allergen Reactions    Erythromycin     Niacin And Related     Penicillins     Sulfa Antibiotics      Medications  dilTIAZem, 30 mg, Oral, 4 times per day    insulin glargine, 10 Units, Subcutaneous, Daily    piperacillin-tazobactam, 3.375 g, Intravenous, Q8H **AND** [DISCONTINUED] levofloxacin, 750 mg, Intravenous, Q48H    sodium chloride flush, 10 mL, Intravenous, 2 times per day    ipratropium-albuterol, 1 ampule, Inhalation, Q4H WA    insulin lispro, 0-6 Units, Subcutaneous, TID WC    insulin lispro, 0-3 Units, Subcutaneous, Nightly    dilTIAZem (CARDIZEM) 125 mg in dextrose 5% 125 mL infusion 5 mg/hr (11/15/20 0367)    sodium chloride 75 mL/hr (11/13/20 0314)  dextrose         ASSESSMENT   1. Acute hypoxic respiratory failure, stable to improved  2. Large left-sided pleural effusion  3. Status post ultrasound-guided thoracentesis  4. Possible heart failure versus parapneumonic effusion  5. Community-acquired pneumonia  6. Hypertension  7. Hyperlipidemia  8. Diabetes mellitus  9. History of stroke  10. Dysphagia    PLAN   Patient had recurrent effusion with mid-line shift in a such a short period of time, repeat thoracentesis ordered, will start steroids to reduce rate of fluid build up by reducing the inflammation triggering it. And will follow up with repeat CXR post thoracentesis in order to be used a s a reference to follow on the rate of reaccumulation, in case of rapid recurrence, she may benefit from VATS with pleurodesis and possibly pleural biopsy in case of abnormal findings during the procedure.    Follow up on the MRSA screen and keep off [facility] unless positive       Rai Martínez  11/15/20  10:53 AM    Dictated Using [employer] Dictation

## 2020-11-16 ENCOUNTER — APPOINTMENT (OUTPATIENT)
Dept: CT IMAGING | Age: 85
DRG: 871 | End: 2020-11-16
Attending: HOSPITALIST
Payer: MEDICARE

## 2020-11-16 ENCOUNTER — OUTSIDE FACILITY SERVICE (OUTPATIENT)
Dept: PULMONOLOGY | Facility: CLINIC | Age: 85
End: 2020-11-16

## 2020-11-16 ENCOUNTER — APPOINTMENT (OUTPATIENT)
Dept: GENERAL RADIOLOGY | Age: 85
DRG: 871 | End: 2020-11-16
Attending: HOSPITALIST
Payer: MEDICARE

## 2020-11-16 LAB
ANAEROBIC CULTURE: NORMAL
ANION GAP SERPL CALCULATED.3IONS-SCNC: 8 MMOL/L (ref 7–19)
BODY FLUID CULTURE, STERILE: NORMAL
BUN BLDV-MCNC: 16 MG/DL (ref 8–23)
CALCIUM SERPL-MCNC: 9.3 MG/DL (ref 8.8–10.2)
CHLORIDE BLD-SCNC: 102 MMOL/L (ref 98–111)
CO2: 28 MMOL/L (ref 22–29)
CREAT SERPL-MCNC: 0.7 MG/DL (ref 0.5–0.9)
CULTURE, BLOOD 2: NORMAL
EKG P AXIS: 48 DEGREES
EKG P-R INTERVAL: 210 MS
EKG Q-T INTERVAL: 384 MS
EKG QRS DURATION: 90 MS
EKG QTC CALCULATION (BAZETT): 419 MS
EKG T AXIS: 120 DEGREES
GFR AFRICAN AMERICAN: >59
GFR NON-AFRICAN AMERICAN: >60
GLUCOSE BLD-MCNC: 121 MG/DL (ref 74–109)
GLUCOSE BLD-MCNC: 176 MG/DL (ref 70–99)
GLUCOSE BLD-MCNC: 178 MG/DL (ref 70–99)
GLUCOSE BLD-MCNC: 238 MG/DL (ref 70–99)
GLUCOSE BLD-MCNC: 241 MG/DL (ref 70–99)
GRAM STAIN RESULT: NORMAL
HCT VFR BLD CALC: 27.4 % (ref 37–47)
HEMOGLOBIN: 8.9 G/DL (ref 12–16)
MAGNESIUM: 1.4 MG/DL (ref 1.6–2.4)
MCH RBC QN AUTO: 28.7 PG (ref 27–31)
MCHC RBC AUTO-ENTMCNC: 32.5 G/DL (ref 33–37)
MCV RBC AUTO: 88.4 FL (ref 81–99)
MRSA CULTURE ONLY: NORMAL
PDW BLD-RTO: 13.7 % (ref 11.5–14.5)
PERFORMED ON: ABNORMAL
PLATELET # BLD: 357 K/UL (ref 130–400)
PMV BLD AUTO: 9.6 FL (ref 9.4–12.3)
POTASSIUM REFLEX MAGNESIUM: 3 MMOL/L (ref 3.5–5)
RBC # BLD: 3.1 M/UL (ref 4.2–5.4)
SODIUM BLD-SCNC: 138 MMOL/L (ref 136–145)
WBC # BLD: 13.7 K/UL (ref 4.8–10.8)

## 2020-11-16 PROCEDURE — 71250 CT THORAX DX C-: CPT

## 2020-11-16 PROCEDURE — 83735 ASSAY OF MAGNESIUM: CPT

## 2020-11-16 PROCEDURE — 93010 ELECTROCARDIOGRAM REPORT: CPT | Performed by: INTERNAL MEDICINE

## 2020-11-16 PROCEDURE — 2580000003 HC RX 258: Performed by: HOSPITALIST

## 2020-11-16 PROCEDURE — 2700000000 HC OXYGEN THERAPY PER DAY

## 2020-11-16 PROCEDURE — 36415 COLL VENOUS BLD VENIPUNCTURE: CPT

## 2020-11-16 PROCEDURE — 71045 X-RAY EXAM CHEST 1 VIEW: CPT

## 2020-11-16 PROCEDURE — 93005 ELECTROCARDIOGRAM TRACING: CPT | Performed by: INTERNAL MEDICINE

## 2020-11-16 PROCEDURE — 6360000002 HC RX W HCPCS: Performed by: HOSPITALIST

## 2020-11-16 PROCEDURE — 6370000000 HC RX 637 (ALT 250 FOR IP): Performed by: HOSPITALIST

## 2020-11-16 PROCEDURE — 82947 ASSAY GLUCOSE BLOOD QUANT: CPT

## 2020-11-16 PROCEDURE — 99233 SBSQ HOSP IP/OBS HIGH 50: CPT | Performed by: INTERNAL MEDICINE

## 2020-11-16 PROCEDURE — 80048 BASIC METABOLIC PNL TOTAL CA: CPT

## 2020-11-16 PROCEDURE — 99232 SBSQ HOSP IP/OBS MODERATE 35: CPT | Performed by: INTERNAL MEDICINE

## 2020-11-16 PROCEDURE — 6370000000 HC RX 637 (ALT 250 FOR IP): Performed by: INTERNAL MEDICINE

## 2020-11-16 PROCEDURE — 6370000000 HC RX 637 (ALT 250 FOR IP): Performed by: FAMILY MEDICINE

## 2020-11-16 PROCEDURE — 94640 AIRWAY INHALATION TREATMENT: CPT

## 2020-11-16 PROCEDURE — 85027 COMPLETE CBC AUTOMATED: CPT

## 2020-11-16 PROCEDURE — 2140000000 HC CCU INTERMEDIATE R&B

## 2020-11-16 RX ADMIN — IPRATROPIUM BROMIDE AND ALBUTEROL SULFATE 1 AMPULE: .5; 3 SOLUTION RESPIRATORY (INHALATION) at 10:30

## 2020-11-16 RX ADMIN — MAGNESIUM SULFATE HEPTAHYDRATE 1 G: 1 INJECTION, SOLUTION INTRAVENOUS at 05:41

## 2020-11-16 RX ADMIN — IPRATROPIUM BROMIDE AND ALBUTEROL SULFATE 1 AMPULE: .5; 3 SOLUTION RESPIRATORY (INHALATION) at 14:48

## 2020-11-16 RX ADMIN — Medication 10 UNITS: at 09:15

## 2020-11-16 RX ADMIN — Medication 10 ML: at 20:19

## 2020-11-16 RX ADMIN — PIPERACILLIN AND TAZOBACTAM 3.38 G: 3; .375 INJECTION, POWDER, LYOPHILIZED, FOR SOLUTION INTRAVENOUS at 09:14

## 2020-11-16 RX ADMIN — POTASSIUM CHLORIDE 10 MEQ: 7.46 INJECTION, SOLUTION INTRAVENOUS at 06:44

## 2020-11-16 RX ADMIN — POTASSIUM CHLORIDE 10 MEQ: 7.46 INJECTION, SOLUTION INTRAVENOUS at 16:47

## 2020-11-16 RX ADMIN — POTASSIUM CHLORIDE 10 MEQ: 7.46 INJECTION, SOLUTION INTRAVENOUS at 09:14

## 2020-11-16 RX ADMIN — POTASSIUM CHLORIDE 10 MEQ: 7.46 INJECTION, SOLUTION INTRAVENOUS at 18:27

## 2020-11-16 RX ADMIN — PIPERACILLIN AND TAZOBACTAM 3.38 G: 3; .375 INJECTION, POWDER, LYOPHILIZED, FOR SOLUTION INTRAVENOUS at 16:47

## 2020-11-16 RX ADMIN — POTASSIUM CHLORIDE 10 MEQ: 7.46 INJECTION, SOLUTION INTRAVENOUS at 11:12

## 2020-11-16 RX ADMIN — IPRATROPIUM BROMIDE AND ALBUTEROL SULFATE 1 AMPULE: .5; 3 SOLUTION RESPIRATORY (INHALATION) at 19:19

## 2020-11-16 RX ADMIN — MAGNESIUM SULFATE HEPTAHYDRATE 1 G: 1 INJECTION, SOLUTION INTRAVENOUS at 04:05

## 2020-11-16 RX ADMIN — IPRATROPIUM BROMIDE AND ALBUTEROL SULFATE 1 AMPULE: .5; 3 SOLUTION RESPIRATORY (INHALATION) at 06:39

## 2020-11-16 RX ADMIN — INSULIN LISPRO 1 UNITS: 100 INJECTION, SOLUTION INTRAVENOUS; SUBCUTANEOUS at 20:45

## 2020-11-16 NOTE — PROGRESS NOTES
Cardiology Daily Note Elen Swenson MD      Patient:  Jerrica Alvarez  [id number]    Patient Active Problem List    Diagnosis Date Noted    Supraventricular tachycardia ([address] 11/13/2020     Priority: Low    Palliative care patient 11/12/2020     Priority: Low    Pleural effusion 11/12/2020     Priority: Low    Sepsis ([address] 11/12/2020     Priority: Low    Hyponatremia 11/12/2020     Priority: Low    Normocytic anemia 11/12/2020     Priority: Low    Type 2 diabetes mellitus ([address] 11/12/2020     Priority: Low    History of stroke with residual deficit 11/12/2020     Priority: Low    Dementia ([address] 11/12/2020     Priority: Low    Hypocalcemia 11/12/2020     Priority: Low    Essential hypertension 11/12/2020     Priority: Low    Pneumonia 11/11/2020     Priority: Low       Admit Date:  11/11/2020    Admission Problem List: Present on Admission:   Pneumonia   Palliative care patient   Pleural effusion   Sepsis ([address]   Hyponatremia   Normocytic anemia   Type 2 diabetes mellitus (HCC)   History of stroke with residual deficit   Dementia ([address]   Hypocalcemia   Essential hypertension   Supraventricular tachycardia ([address]      Cardiac Specific Data:  Specialty Problems        Cardiology Problems    Essential hypertension        Supraventricular tachycardia ([address]              Subjective:  Ms. Chano Ledezma seen today vital signs stable no new cardiac complaints or issues reported. On Cardizem 30 every 6 will adjust dose. No other issues reported    Objective:   /72   Pulse 88   Temp 97.9 °F (36.6 °C) (Temporal)   Resp 18   Ht 5' 7\" (1.702 m)   Wt 121 lb 4 oz (55 kg)   SpO2 94%   BMI 18.99 kg/m²       Intake/Output Summary (Last 24 hours) at 11/16/2020 1020  Last data filed at 11/16/2020 0959  Gross per 24 hour   Intake 233 ml   Output --   Net 233 ml       Prior to Admission medications    Medication Sig Start Date End Date Taking?  Authorizing Provider   aspirin EC 81 MG EC tablet Take 81 mg by mouth unremarkable. Signed by Dr Niesha Rivera on 11/16/2020 10:05 AM    Xr Chest Portable    Result Date: 11/15/2020  EXAM: XR CHEST PORTABLE -- 11/15/2020 4:40 AM HISTORY: 89 years, Female, status post left pleural effusion thoracentesis COMPARISON: 11/11/2020 TECHNIQUE:  1 images. Frontal view of the chest. FINDINGS:  Left hemithorax is completely opacified, increased compared to 11/11/2020. No convincing pneumothorax. Right lung with coarse interstitial opacities. No focal consolidation of the right lung. Cardiac mediastinal silhouette are limited in assessment due to technique. There appears to be mass effect of the left hemithorax on the trachea causing rightward deviation. No acute bony finding. 1. Complete opacification of the left lung, increased compared to 11/11/2020. There appears to be mass effect on the trachea. Although a large pleural effusion and pneumonia could cause this appearance, underlying mass is not excluded. Recommend correlation with prior outside imaging. If none available, consider CT chest (preferably with contrast if patient's renal function permits). Signed by Dr Joe Peace on 11/15/2020 6:42 AM    Xr Chest Portable    Result Date: 11/11/2020  Examination. XR CHEST PORTABLE 11/11/2020 8:01 AM History: Shortness of breath. A single frontal portable upright view of the chest is obtained. There is no previous study for comparison. There is a left lower lung consolidation and a large pleural effusion, free and loculated, in the left chest extending into the left apex. The right lung is poorly distended. No obvious abnormality, infiltrate or consolidation. The Heart size is not evaluated in this study. The distal trachea is displaced towards the right which is probably due to a tortuous and atheromatous thoracic aorta. There is no pneumothorax. Consolidation and pleural effusion in the left chest. The right lung is unremarkable.  Signed by Dr Niesha Rivera on 11/11/2020 3:41 PM    Us catheter was then withdrawn. The fluid was sent to the laboratory for analysis. The patient tolerated the procedure well. No evidence of complication. Successful ultrasound-guided diagnostic and therapeutic left thoracentesis with drainage of 1000 mL of serous sanguinous pleural fluid. Signed by Dr Lb Mckay on 11/11/2020 4:11 PM        Assessment:  1. Suspected sepsis  2. Supraventricular tachycardia  3. Dementia  4. Cerebral artery occlusion with cerebral infarction  5. Diabetes mellitus  6. Hyperlipidemia  7. Hypertension  8. Severe sensorineural hearing loss  9. Pleural effusion  10. Anemia  11. Hyponatremia  12. Hypocalcemia  13. Echocardiogram 11/11/2020 normal left ventricular function but suboptimal study no valvular regurgitation aortic valve sclerosis  14. Fattening 1.3  15. Leukocytosis       Plan:  1.  We will adjust diltiazem to  mg p.o. twice daily continue to monitor    Mariana Gold MD 11/16/2020 10:20 AM

## 2020-11-16 NOTE — CARE COORDINATION
GINNA left a VM for Geno in admissions at [facility] requesting a call back for confirmation of referral and to send updated clinicals for the facility to follow.      DAWNA [address] [facility]  Ph: [phone]  Fa: [phone]

## 2020-11-16 NOTE — PLAN OF CARE
Problem: Falls - Risk of:  Goal: Will remain free from falls  Description: Will remain free from falls  Outcome: Ongoing  Goal: Absence of physical injury  Description: Absence of physical injury  Outcome: Ongoing     Problem: Skin Integrity:  Goal: Will show no infection signs and symptoms  Description: Will show no infection signs and symptoms  Outcome: Ongoing  Goal: Absence of new skin breakdown  Description: Absence of new skin breakdown  Outcome: Ongoing     Problem: Infection:  Goal: Will remain free from infection  Description: Will remain free from infection  Outcome: Ongoing     Problem: Safety:  Goal: Free from accidental physical injury  Description: Free from accidental physical injury  Outcome: Ongoing  Goal: Free from intentional harm  Description: Free from intentional harm  Outcome: Ongoing     Problem: Daily Care:  Goal: Daily care needs are met  Description: Daily care needs are met  Outcome: Ongoing     Problem: Pain:  Goal: Patient's pain/discomfort is manageable  Description: Patient's pain/discomfort is manageable  Outcome: Ongoing  Goal: Pain level will decrease  Description: Pain level will decrease  Outcome: Ongoing  Goal: Control of acute pain  Description: Control of acute pain  Outcome: Ongoing  Goal: Control of chronic pain  Description: Control of chronic pain  Outcome: Ongoing     Problem: Skin Integrity:  Goal: Skin integrity will stabilize  Description: Skin integrity will stabilize  Outcome: Ongoing     Problem: Discharge Planning:  Goal: Patients continuum of care needs are met  Description: Patients continuum of care needs are met  Outcome: Ongoing     Problem: Confusion - Acute:  Goal: Absence of continued neurological deterioration signs and symptoms  Description: Absence of continued neurological deterioration signs and symptoms  Outcome: Ongoing  Goal: Mental status will be restored to baseline  Description: Mental status will be restored to baseline  Outcome: Ongoing Problem: Discharge Planning:  Goal: Ability to perform activities of daily living will improve  Description: Ability to perform activities of daily living will improve  Outcome: Ongoing  Goal: Participates in care planning  Description: Participates in care planning  Outcome: Ongoing     Problem: Injury - Risk of, Physical Injury:  Goal: Will remain free from falls  Description: Will remain free from falls  Outcome: Ongoing  Goal: Absence of physical injury  Description: Absence of physical injury  Outcome: Ongoing     Problem: Mood - Altered:  Goal: Mood stable  Description: Mood stable  Outcome: Ongoing  Goal: Absence of abusive behavior  Description: Absence of abusive behavior  Outcome: Ongoing  Goal: Verbalizations of feeling emotionally comfortable while being cared for will increase  Description: Verbalizations of feeling emotionally comfortable while being cared for will increase  Outcome: Ongoing     Problem: Psychomotor Activity - Altered:  Goal: Absence of psychomotor disturbance signs and symptoms  Description: Absence of psychomotor disturbance signs and symptoms  Outcome: Ongoing     Problem: Sensory Perception - Impaired:  Goal: Demonstrations of improved sensory functioning will increase  Description: Demonstrations of improved sensory functioning will increase  Outcome: Ongoing  Goal: Decrease in sensory misperception frequency  Description: Decrease in sensory misperception frequency  Outcome: Ongoing  Goal: Able to refrain from responding to false sensory perceptions  Description: Able to refrain from responding to false sensory perceptions  Outcome: Ongoing  Goal: Demonstrates accurate environmental perceptions  Description: Demonstrates accurate environmental perceptions  Outcome: Ongoing  Goal: Able to distinguish between reality-based and nonreality-based thinking  Description: Able to distinguish between reality-based and nonreality-based thinking  Outcome: Ongoing  Goal: Able to interrupt nonreality-based thinking  Description: Able to interrupt nonreality-based thinking  Outcome: Ongoing     Problem: Sleep Pattern Disturbance:  Goal: Appears well-rested  Description: Appears well-rested  Outcome: Ongoing

## 2020-11-16 NOTE — PROGRESS NOTES
Hospitalist Progress Note  11/16/2020 1:01 PM  Subjective:   Admit Date: 11/11/2020  PCP: Femi Partida    Chief Complaint: shortness of breath, pleural effusion    Subjective: Patient is resting comfortably. Very hard of hearing but seems to understand when my voice is raised loud enough and seems oriented to place, time, and condition. She is lethargic but not distressed. History is otherwise unchanged. [facility] Hospital History:   11-11: Presented overnight from OSH ED where she was taken with shortness of breath. Large pleural effusion on left discovered and they were unable to perform thoracentesis in ED and thus was transferred for this and for pulmonology consult. Patient demented, unable to provide history. Admitted to med/surg on levofloxacin, piperacillin/tazobactam, and vancomycin. Underwent US-guided thoracentesis. Pulmonology consulted. 11-12: Still very confused but seems to be breathing more easily now. Will need to contact family to determine her baseline mental status. 11-13: Nauseated and coughing up small amounts of thick sputum. Feels worse today. Per family, she is sharp at her baseline. Tachycardic with long runs of SVT to 180s. Transferred to PCU with diltiazem bolus and drip ordered. Cardiology consulted. Guaifenesin/dextromethorphan to thin secretions. 11-14: Oral diltiazem, wean from drip. Levofloxacin and vancomycin stopped by pulmonology. Improving, possibly home soon. 11-15: Significantly increased left pleural effusion, repeat thoracentesis done with 900 mL removed. More short of breath and lethargic today. Discussed with pulmonology, will be started on steroids. Possibility that she will need VATS. 11-16: No significant change in radiographic appearance. Pulmonology is ordering CT, may require CT surgery consult, though she is a DNR and a poor surgical candidate so unsure what their surgical offerings would be.     ROS: 14 point review of systems is negative except as specifically addressed above.     DIET DYSPHAGIA PUREED; Mildly Thick (Nectar)    Intake/Output Summary (Last 24 hours) at 11/16/2020 1301  Last data filed at 11/16/2020 0959  Gross per 24 hour   Intake 233 ml   Output --   Net 233 ml     Medications:   dilTIAZem (CARDIZEM) 125 mg in dextrose 5% 125 mL infusion 5 mg/hr (11/15/20 0615)    sodium chloride 75 mL/hr (11/13/20 1154)    dextrose       Current Facility-Administered Medications   Medication Dose Route Frequency Provider Last Rate Last Dose    dilTIAZem (CARDIZEM) tablet 120 mg  120 mg Oral 2 times per day Kirk Rios MD        potassium chloride (KLOR-CON M) extended release tablet 40 mEq  40 mEq Oral PRN Klarissa Castañeda MD        Or    potassium bicarb-citric acid (EFFER-K) effervescent tablet 40 mEq  40 mEq Oral PRN Klarissa Castañeda MD   40 mEq at 11/15/20 0615    Or    potassium chloride 10 mEq/100 mL IVPB (Peripheral Line)  10 mEq Intravenous PRN Klarissa Castañeda  mL/hr at 11/16/20 1112 10 mEq at 11/16/20 1112    magnesium sulfate 1 g in dextrose 5% 100 mL IVPB  1 g Intravenous PRN Klarissa Castañeda MD   Stopped at 11/16/20 0643    insulin glargine (LANTUS) injection vial 10 Units  10 Units Subcutaneous Daily [facility], DO   10 Units at 11/16/20 0915    piperacillin-tazobactam (ZOSYN) 3.375 g in dextrose 5 % 50 mL IVPB extended infusion (mini-bag)  3.375 g Intravenous Q8H Gold Umaña MD 12.5 mL/hr at 11/16/20 0914 3.375 g at 11/16/20 0914    dilTIAZem 125 mg in dextrose 5 % 125 mL infusion  5 mg/hr Intravenous Continuous Seneca Thorpe, DO 5 mL/hr at 11/15/20 0615 5 mg/hr at 11/15/20 0615    guaiFENesin-dextromethorphan (ROBITUSSIN DM) 100-10 MG/5ML syrup 5 mL  5 mL Oral Q4H PRN [facility] Thorpe, DO        0.9 % sodium chloride infusion   Intravenous Continuous Gold Umaña MD 75 mL/hr at 11/13/20 1154 75 mL/hr at 11/13/20 1154    sodium chloride flush 0.9 % injection 10 mL  10 mL Intravenous 2 times per day Janae Rose Lamont Loera MD   10 mL at 11/15/20 2025    sodium chloride flush 0.9 % injection 10 mL  10 mL Intravenous PRN Katiana Membreno MD        acetaminophen (TYLENOL) tablet 650 mg  650 mg Oral Q6H PRN Katiana Membreno MD   650 mg at 11/11/20 1948    Or    acetaminophen (TYLENOL) suppository 650 mg  650 mg Rectal Q6H PRN Katiana Membreno MD        polyethylene glycol [facility]) packet 17 g  17 g Oral Daily PRN Katiana Membreno MD        promethazine (PHENERGAN) tablet 12.5 mg  12.5 mg Oral Q6H PRN Katiana Membreno MD        Or    ondansetron [facility] injection 4 mg  4 mg Intravenous Q6H PRN Katiana Membreno MD   4 mg at 11/12/20 1053    ipratropium-albuterol (DUONEB) nebulizer solution 1 ampule  1 ampule Inhalation Q4H WA Katiana Membreno MD   1 ampule at 11/16/20 1030    insulin lispro (HUMALOG) injection vial 0-6 Units  0-6 Units Subcutaneous TID WC Katiana Membreno MD   2 Units at 11/15/20 1704    insulin lispro (HUMALOG) injection vial 0-3 Units  0-3 Units Subcutaneous Nightly Katiana Membreno MD   1 Units at 11/15/20 2026    glucose (GLUTOSE) 40 % oral gel 15 g  15 g Oral PRN Katiana Membreno MD        dextrose 50 % IV solution  12.5 g Intravenous PRN Katiana Membreno MD        glucagon (rDNA) injection 1 mg  1 mg Intramuscular PRN Katiana Membreno MD        dextrose 5 % solution  100 mL/hr Intravenous SADAF Membreno MD            Labs:     Recent Labs     11/14/20  0216 11/15/20  0213 11/16/20  0157   WBC 16.9* 14.7* 13.7*   RBC 3.27* 3.25* 3.10*   HGB 9.3* 9.2* 8.9*   HCT 30.1* 29.3* 27.4*   MCV 92.0 90.2 88.4   MCH 28.4 28.3 28.7   MCHC 30.9* 31.4* 32.5*    417* 357     Recent Labs     11/14/20  0216 11/15/20  0213 11/16/20  0157   * 137 138   K 3.4* 3.1* 3.0*   ANIONGAP 17 13 8   CL 96* 101 102   CO2 20* 23 28   BUN 12 15 16   CREATININE 0.8 0.8 0.7   GLUCOSE 268* 170* 121*   CALCIUM 8.9 9.3 therapy for pneumonia. Levofloxacin and vancomycin discontinued by pulmonology. Cultures pending to guide therapy. Rate controlled on oral diltiazem. Pulmonology consulted, appreciate input. CT today. May consult CT surgery depending on the findings. Cardiology onboard.     Advance Directive: DNR-CC    DVT prophylaxis: SCDs    Discharge planning: [facility]      Corrichelle Hairston DO  [facility] Hospitalist

## 2020-11-16 NOTE — PROGRESS NOTES
Pulmonary/Critical Care progress Note    Patient Name: Esdras Reyes  Age/Sex: 80 y.o. female  : 1931  MRN: [id number]    Date Of Admission: 2020  Date of Encounter Visit: 20      Chief Complaints: Pneumonia with parapneumonic effusion    Hospital Course: Came in with pneumonia with large left-sided pleural effusion, had 1 L of exudative effusion drained with improvement in the respiratory status however that did not last long enough and patient had to be retapped again on 11/15/2020 with 900 cc removed. Patient is very hard of hearing but at this point she is on oxygen and in no significant distress        System Review:   CONSTITUTIONAL: No fever or chills  CARDIOVASCULAR: No chest pain, chest pressure or chest discomfort, she denies any palpitation, has no edema  RESPIRATORY: Recurrent shortness of breath requiring repeat thoracentesis, currently she is lethargic but responsive on nasal cannula oxygen   GI: No anorexia, nausea, vomiting or diarrhea. No abdominal pain or bleeding. HEMATOLOGIC: No bleeding or bruising    Vital Signs:  Blood pressure 130/72, pulse 85, temperature 97.9 °F (36.6 °C), temperature source Temporal, resp. rate 18, height 5' 7\" (1.702 m), weight 121 lb 4 oz (55 kg), SpO2 94 %. Intake/Output Summary (Last 24 hours) at 2020 1124  Last data filed at 2020 0959  Gross per 24 hour   Intake 233 ml   Output --   Net 233 ml     Body mass index is 18.99 kg/m². Physical exam  GENERAL APPEARANCE: Hard of hearing elderly lady, sickly looking, but in no acute distress, confused  HEENT: Eyes are symmetrical and pupils are reactive, no scleral icterus or scleral edema, no discharge from the eyes, external ears and nose normal, moist mucous membrane, no thrush  PULMONARY: Normal chest on inspection, surprisingly still have some breath sounds on the left side yet diminished compared to the right.   No crackles  CARDIAC: Rate controlled, irregular, normal S1-S2, no gallop, positive soft murmur . ABDOMEN: Soft, nontender, positive bowel sounds, no hepatospleno megaly, no rebound, no distention  SKIN: Warm and dry, no rashes or skin lesion, no skin palpable nodules, good turgor pressure  EXTREMITIES: palpable pulses distally with no cyanosis or clubbing, no lower extremities edema  NEURO: Moving all extremities, able to follow commands, strength 5/5 and symmetrical, intact sensation      LABS  Recent Labs     11/14/20  0216 11/15/20  0213 11/16/20  0157   WBC 16.9* 14.7* 13.7*   RBC 3.27* 3.25* 3.10*   HGB 9.3* 9.2* 8.9*   HCT 30.1* 29.3* 27.4*    417* 357   MCV 92.0 90.2 88.4   MCH 28.4 28.3 28.7   MCHC 30.9* 31.4* 32.5*   RDW 13.5 13.5 13.7      Recent Labs     11/13/20  1226  11/14/20 0216 11/15/20  0213 11/15/20  0944 11/16/20  0157   NA  --   --  133* 137  --  138   K  --   --  3.4* 3.1*  --  3.0*   CL  --   --  96* 101  --  102   CO2  --   --  20* 23  --  28   BUN  --   --  12 15  --  16   CREATININE  --   --  0.8 0.8  --  0.7   CALCIUM  --   --  8.9 9.3  --  9.3   GLUCOSE  --   --  268* 170*  --  121*   MG  --    < > 1.3* 1.4*  --  1.4*   INR  --   --   --   --  1.50*  --    TSH 1.700  --   --   --   --   --     < > = values in this interval not displayed. Recent Labs     11/15/20  1000   LABGRAM Moderate WBC's (Polymorphonuclear)  No organisms seen             Imaging:       Allergies  Allergies   Allergen Reactions    Erythromycin     Niacin And Related     Penicillins     Sulfa Antibiotics      Medications    dilTIAZem, 120 mg, Oral, 2 times per day    insulin glargine, 10 Units, Subcutaneous, Daily    piperacillin-tazobactam, 3.375 g, Intravenous, Q8H **AND** [DISCONTINUED] levofloxacin, 750 mg, Intravenous, Q48H    sodium chloride flush, 10 mL, Intravenous, 2 times per day    ipratropium-albuterol, 1 ampule, Inhalation, Q4H WA    insulin lispro, 0-6 Units, Subcutaneous, TID WC    insulin lispro, 0-3 Units, Subcutaneous, Nightly    dilTIAZem (CARDIZEM) 125 mg in dextrose 5% 125 mL infusion 5 mg/hr (11/15/20 0615)    sodium chloride 75 mL/hr (11/13/20 1154)    dextrose         ASSESSMENT   1. Acute hypoxic respiratory failure, stable to improved  2. Large left-sided pleural effusion  3. Status post ultrasound-guided thoracentesis  4. Possible heart failure versus parapneumonic effusion  5. Community-acquired pneumonia  6. Hypertension  7. Hyperlipidemia  8. Diabetes mellitus  9. History of stroke  10. Dysphagia    PLAN   P patient had 2 thoracentesis so far the first 1 removed 1 L, and the second 1 done on 11/15/2020 removed 900 cc with still significant amount of effusion left behind with secondary dyspnea as well. Given the size of the effusion, the lack of improvement, and the rapid reaccumulation despite antibiotic treatment, the patient may require more aggressive measures however before any surgical recommendations we will evaluate more thoroughly with CT of the chest without IV contrast to check for any loculation and then consider assessing the risk versus the benefit of having video-assisted thorascopic procedure, will defer the final decision to the surgery  depending on the CAT scan finding. Consider consulting the surgical service once the CAT scan is done in case of any loculation.       Julita Bardales  11/16/20  11:24 AM    Dictated Using Dragon Dictation

## 2020-11-17 ENCOUNTER — TELEPHONE (OUTPATIENT)
Dept: HEMATOLOGY | Age: 85
End: 2020-11-17

## 2020-11-17 ENCOUNTER — OUTSIDE FACILITY SERVICE (OUTPATIENT)
Dept: PULMONOLOGY | Facility: CLINIC | Age: 85
End: 2020-11-17

## 2020-11-17 LAB
ANION GAP SERPL CALCULATED.3IONS-SCNC: 10 MMOL/L (ref 7–19)
BUN BLDV-MCNC: 15 MG/DL (ref 8–23)
CALCIUM SERPL-MCNC: 8.8 MG/DL (ref 8.8–10.2)
CHLORIDE BLD-SCNC: 101 MMOL/L (ref 98–111)
CO2: 26 MMOL/L (ref 22–29)
CREAT SERPL-MCNC: 0.7 MG/DL (ref 0.5–0.9)
EKG P AXIS: NORMAL DEGREES
EKG P AXIS: NORMAL DEGREES
EKG P-R INTERVAL: NORMAL MS
EKG P-R INTERVAL: NORMAL MS
EKG Q-T INTERVAL: 322 MS
EKG Q-T INTERVAL: 348 MS
EKG QRS DURATION: 86 MS
EKG QRS DURATION: 86 MS
EKG QTC CALCULATION (BAZETT): 369 MS
EKG QTC CALCULATION (BAZETT): 393 MS
EKG T AXIS: 117 DEGREES
EKG T AXIS: 176 DEGREES
GFR AFRICAN AMERICAN: >59
GFR NON-AFRICAN AMERICAN: >60
GLUCOSE BLD-MCNC: 119 MG/DL (ref 74–109)
GLUCOSE BLD-MCNC: 167 MG/DL (ref 70–99)
GLUCOSE BLD-MCNC: 197 MG/DL (ref 70–99)
GLUCOSE BLD-MCNC: 200 MG/DL (ref 70–99)
GLUCOSE BLD-MCNC: 91 MG/DL (ref 70–99)
HCT VFR BLD CALC: 29.9 % (ref 37–47)
HEMOGLOBIN: 9.4 G/DL (ref 12–16)
MCH RBC QN AUTO: 28.3 PG (ref 27–31)
MCHC RBC AUTO-ENTMCNC: 31.4 G/DL (ref 33–37)
MCV RBC AUTO: 90.1 FL (ref 81–99)
PDW BLD-RTO: 13.8 % (ref 11.5–14.5)
PERFORMED ON: ABNORMAL
PERFORMED ON: NORMAL
PLATELET # BLD: 345 K/UL (ref 130–400)
PMV BLD AUTO: 9.6 FL (ref 9.4–12.3)
POTASSIUM REFLEX MAGNESIUM: 3.6 MMOL/L (ref 3.5–5)
RBC # BLD: 3.32 M/UL (ref 4.2–5.4)
SODIUM BLD-SCNC: 137 MMOL/L (ref 136–145)
WBC # BLD: 12.8 K/UL (ref 4.8–10.8)

## 2020-11-17 PROCEDURE — 2580000003 HC RX 258: Performed by: HOSPITALIST

## 2020-11-17 PROCEDURE — 82947 ASSAY GLUCOSE BLOOD QUANT: CPT

## 2020-11-17 PROCEDURE — 2140000000 HC CCU INTERMEDIATE R&B

## 2020-11-17 PROCEDURE — 6370000000 HC RX 637 (ALT 250 FOR IP): Performed by: HOSPITALIST

## 2020-11-17 PROCEDURE — 99233 SBSQ HOSP IP/OBS HIGH 50: CPT | Performed by: INTERNAL MEDICINE

## 2020-11-17 PROCEDURE — 80048 BASIC METABOLIC PNL TOTAL CA: CPT

## 2020-11-17 PROCEDURE — 99222 1ST HOSP IP/OBS MODERATE 55: CPT | Performed by: NURSE PRACTITIONER

## 2020-11-17 PROCEDURE — 2700000000 HC OXYGEN THERAPY PER DAY

## 2020-11-17 PROCEDURE — 36415 COLL VENOUS BLD VENIPUNCTURE: CPT

## 2020-11-17 PROCEDURE — 6370000000 HC RX 637 (ALT 250 FOR IP): Performed by: INTERNAL MEDICINE

## 2020-11-17 PROCEDURE — 99222 1ST HOSP IP/OBS MODERATE 55: CPT | Performed by: INTERNAL MEDICINE

## 2020-11-17 PROCEDURE — 94640 AIRWAY INHALATION TREATMENT: CPT

## 2020-11-17 PROCEDURE — 93010 ELECTROCARDIOGRAM REPORT: CPT | Performed by: INTERNAL MEDICINE

## 2020-11-17 PROCEDURE — 93005 ELECTROCARDIOGRAM TRACING: CPT | Performed by: INTERNAL MEDICINE

## 2020-11-17 PROCEDURE — 92526 ORAL FUNCTION THERAPY: CPT

## 2020-11-17 PROCEDURE — 85027 COMPLETE CBC AUTOMATED: CPT

## 2020-11-17 PROCEDURE — 6360000002 HC RX W HCPCS: Performed by: HOSPITALIST

## 2020-11-17 RX ADMIN — Medication 10 ML: at 21:51

## 2020-11-17 RX ADMIN — IPRATROPIUM BROMIDE AND ALBUTEROL SULFATE 1 AMPULE: .5; 3 SOLUTION RESPIRATORY (INHALATION) at 06:34

## 2020-11-17 RX ADMIN — INSULIN LISPRO 1 UNITS: 100 INJECTION, SOLUTION INTRAVENOUS; SUBCUTANEOUS at 11:21

## 2020-11-17 RX ADMIN — PIPERACILLIN AND TAZOBACTAM 3.38 G: 3; .375 INJECTION, POWDER, LYOPHILIZED, FOR SOLUTION INTRAVENOUS at 08:44

## 2020-11-17 RX ADMIN — DILTIAZEM HYDROCHLORIDE 120 MG: 30 TABLET, FILM COATED ORAL at 08:32

## 2020-11-17 RX ADMIN — PIPERACILLIN AND TAZOBACTAM 3.38 G: 3; .375 INJECTION, POWDER, LYOPHILIZED, FOR SOLUTION INTRAVENOUS at 02:57

## 2020-11-17 RX ADMIN — DILTIAZEM HYDROCHLORIDE 120 MG: 30 TABLET, FILM COATED ORAL at 21:50

## 2020-11-17 RX ADMIN — Medication 10 ML: at 08:40

## 2020-11-17 RX ADMIN — INSULIN LISPRO 2 UNITS: 100 INJECTION, SOLUTION INTRAVENOUS; SUBCUTANEOUS at 17:35

## 2020-11-17 RX ADMIN — IPRATROPIUM BROMIDE AND ALBUTEROL SULFATE 1 AMPULE: .5; 3 SOLUTION RESPIRATORY (INHALATION) at 19:33

## 2020-11-17 RX ADMIN — PIPERACILLIN AND TAZOBACTAM 3.38 G: 3; .375 INJECTION, POWDER, LYOPHILIZED, FOR SOLUTION INTRAVENOUS at 17:31

## 2020-11-17 RX ADMIN — IPRATROPIUM BROMIDE AND ALBUTEROL SULFATE 1 AMPULE: .5; 3 SOLUTION RESPIRATORY (INHALATION) at 10:14

## 2020-11-17 RX ADMIN — IPRATROPIUM BROMIDE AND ALBUTEROL SULFATE 1 AMPULE: .5; 3 SOLUTION RESPIRATORY (INHALATION) at 14:18

## 2020-11-17 ASSESSMENT — PAIN SCALES - GENERAL
PAINLEVEL_OUTOF10: 0
PAINLEVEL_OUTOF10: 0

## 2020-11-17 ASSESSMENT — ENCOUNTER SYMPTOMS
CHOKING: 1
SHORTNESS OF BREATH: 1
COUGH: 1

## 2020-11-17 NOTE — CARE COORDINATION
Left another message requesting approval for pt services from referral sent 11/12.      DAWNA Arambula [facility]  Ph: [phone]   Fa: [phone]

## 2020-11-17 NOTE — CONSULTS
MEDICAL ONCOLOGY CONSULTATION    Pt Name: Martell Romo  MRN: [id number]  YOB: 1931  Date of evaluation: 11/17/2020    REASON FOR CONSULTATION: Left pleural mass  REQUESTING PHYSICIAN: Hospitalist    History Obtained From:    patient (very poor historian), electronic medical record    HISTORY OF PRESENT ILLNESS:    The patient is a 80years old female whom I was being consulted for findings of likely malignancy involving the left lung and pleural space. Unfortunately, poor historian. No family member available. Most of information was retrieved from reviewing medical records. Essentially, she has multiple comorbidities to include a history of diabetes mellitus type 2, hypertension, hyperlipidemia, previous CVA, physical deconditioning. She presented to the ER on 11/11/2020 as a transfer from outside hospital due to findings of a large left pleural effusion. Apparently patient has been had complaints of shortness of breath for a while. She was initially diagnosed with possible pneumonia. She was admitted and started on IV antibiotics. An ultrasound-guided thoracocentesis was performed and about a liter of fluid was taken out. She had a second thoracentesis performed 11/15/2020 with drainage of another 900 cc of fluid. The cytology did not reveal any malignant cells but reactive mesothelial cells. Pulmonary has been consulted and is following the patient. She was also seen by cardiology for supraventricular tachycardia. She has a very poor performance status, malnourished and likely confusional state due to may be dementia. A CT chest performed here showed findings suggesting left side metastatic pleural disease with diffuse masslike-pleural thickening and large malignant effusion throughout the left pleural space. This resulted in a near complete left lung atelectasis. There was a concern for malignancy.   In addition, bo metastasis identified in the left hilum, subcarinal space, left supraclavicular fossa as well as left retrocrural.  In this context, I was consulted. Apparently, the patient was seen by palliative care today. Palliative care talk to the family who agreed that the patient was not a candidate for further diagnostic or therapeutic intervention. Apparently, they were leaning towards consideration of hospice care at facility. 11/15/2020-CT chest showed  1. Findings suggest left-sided metastatic pleural disease with diffuse  masslike-pleural thickening and large malignant effusion throughout  the left pleural space. Resultant near complete left lung atelectasis. Unsure if this is a primary pleural malignancy with pleural spread or  perhaps secondary metastatic pleural disease. 2. Yusra metastases identified in the left hilum, subcarinal space,  left supraclavicular fossa as well as left retrocrural. Left  supraclavicular fossa adenopathy may be amenable to ultrasound-guided  needle aspiration if there is need for pathologic diagnosis. Past Medical History:    Past Medical History:   Diagnosis Date    Blood circulation, collateral     [facility]erebral artery occlusion with cerebral infarction ([facility] [address]     Diabetes mellitus ([facility] [address]     Hyperlipidemia     Hypertension     Palliative care patient 11/12/2020       Past Surgical History:    Past Surgical History:   Procedure Laterality Date    CHOLECYSTECTOMY         Social History:    Social History     Socioeconomic History    Marital status:       Spouse name: Not on file    Number of children: Not on file    Years of education: Not on file    Highest education level: Not on file   Occupational History    Not on file   Social Needs    Financial resource strain: Not on file    Food insecurity     Worry: Not on file     Inability: Not on file    Transportation needs     Medical: Not on file     Non-medical: Not on file   Tobacco Use    Smoking status: Not on file   Substance and Sexual Activity    Alcohol use: Never Continuous  sodium chloride flush 0.9 % injection 10 mL, 10 mL, Intravenous, 2 times per day  sodium chloride flush 0.9 % injection 10 mL, 10 mL, Intravenous, PRN  acetaminophen (TYLENOL) tablet 650 mg, 650 mg, Oral, Q6H PRN **OR** acetaminophen (TYLENOL) suppository 650 mg, 650 mg, Rectal, Q6H PRN  polyethylene glycol (GLYCOLAX) packet 17 g, 17 g, Oral, Daily PRN  promethazine (PHENERGAN) tablet 12.5 mg, 12.5 mg, Oral, Q6H PRN **OR** ondansetron (ZOFRAN) injection 4 mg, 4 mg, Intravenous, Q6H PRN  ipratropium-albuterol (DUONEB) nebulizer solution 1 ampule, 1 ampule, Inhalation, Q4H WA  insulin lispro (HUMALOG) injection vial 0-6 Units, 0-6 Units, Subcutaneous, TID WC  insulin lispro (HUMALOG) injection vial 0-3 Units, 0-3 Units, Subcutaneous, Nightly  glucose (GLUTOSE) 40 % oral gel 15 g, 15 g, Oral, PRN  dextrose 50 % IV solution, 12.5 g, Intravenous, PRN  glucagon (rDNA) injection 1 mg, 1 mg, Intramuscular, PRN  dextrose 5 % solution, 100 mL/hr, Intravenous, PRN    Allergies: Allergies   Allergen Reactions    Erythromycin     Niacin And Related     Penicillins     Sulfa Antibiotics          Subjective   REVIEW OF SYSTEMS:   Cannot be collected due to confusional state    Objective   BP (!) 140/73   Pulse 79   Temp 96.7 °F (35.9 °C) (Temporal)   Resp 18   Ht 5' 7\" (1.702 m)   Wt 124 lb 3.2 oz (56.3 kg)   SpO2 94%   BMI 19.45 kg/m²     PHYSICAL EXAM:  CONSTITUTIONAL: Alert, confused, frail-appearing, cachectic appearing, no acute distress  EYES: Non icteric, pupils equal round   ENT: Mucus membranes moist, no oral pharyngeal lesions, external inspection of ears and nose are normal  NECK: Supple, no masses. No palpable thyroid mass  CHEST/LUNGS: Decreased breath sounds left lung field, normal respiratory effort . O2 nasal cannula 3 L/min  CARDIOVASCULAR: RRR, no murmurs. No lower extremity edema  ABDOMEN: soft, active bowel sounds, no HSM.   No palpable masses  EXTREMITIES: warm, full ROM in all 4 extremities, no focal weakness. SKIN: warm, dry with no rashes or lesions  LYMPH: No cervical, clavicular, axillary, or inguinal lymphadenopathy  NEUROLOGIC: follows commands, non focal   PSYCH: Cannot be assessed        LABORATORY RESULTS REVIEWED BY ME:  Recent Labs     11/17/20  0132 11/16/20  0157 11/15/20  0213   WBC 12.8* 13.7* 14.7*   HGB 9.4* 8.9* 9.2*   HCT 29.9* 27.4* 29.3*   MCV 90.1 88.4 90.2    357 417*       Lab Results   Component Value Date     11/17/2020    K 3.6 11/17/2020     11/17/2020    CO2 26 11/17/2020    BUN 15 11/17/2020    CREATININE 0.7 11/17/2020    GLUCOSE 119 (H) 11/17/2020    CALCIUM 8.8 11/17/2020    PROT 6.2 (L) 11/11/2020    LABALBU 2.8 (L) 11/11/2020    BILITOT 0.4 11/11/2020    ALKPHOS 64 11/11/2020    AST 19 11/11/2020    ALT 10 11/11/2020    LABGLOM >60 11/17/2020    GFRAA >59 11/17/2020       Lab Results   Component Value Date    INR 1.50 (H) 11/15/2020    INR 1.25 (H) 11/11/2020    PROTIME 18.2 (H) 11/15/2020    PROTIME 15.7 (H) 11/11/2020       RADIOLOGY STUDIES REPORT/REVIEWED AND INTERPRETED BY ME:  Xr Chest (2 Vw)    Result Date: 11/15/2020  EXAM: XR CHEST (2 VW) -- 11/15/2020 10:25 AM HISTORY: 80 years, Female, status post left thoracentesis COMPARISON: 11/15/2020 at 5:50 AM TECHNIQUE:  3 images. Frontal and lateral views of the chest. FINDINGS:  No convincing pneumothorax. Some improved aeration of the left upper lung. Left hemithorax remains significantly opacified. Right lung appears clear. Rotation and pulmonary opacity Limited evaluation of the cardiac and mediastinal silhouette. Degenerative changes in the spine. Age-indeterminate mild height loss of a midthoracic vertebral body. 1. Some improved aeration of the left upper lung. No convincing pneumothorax by radiograph. 2. Left hemithorax remain significantly opacified. 3. Age-indeterminate mild height loss of a midthoracic vertebral body. Correlate with patient's symptoms.  Signed by  adenopathy may be amenable to ultrasound-guided needle aspiration if there is need for pathologic diagnosis. Signed by Dr Catherine Carranza on 11/16/2020 3:42 PM    Xr Chest Portable    Result Date: 11/16/2020  Examination. XR CHEST PORTABLE 11/16/2020 8:30 AM History: History of recurrent pneumonia/effusion. A single frontal portable upright view of the chest is compared with the previous study dated 11/15/2020. There is a persistent opacification of the left lung sparing a small area in the left upper lobe medially, similar to the previous study. This is represent a combination of left lung consolidation and pleural effusion, as in the previous study The right lung appears clear without infiltrate, pulmonary congestion or pleural effusion. . No acute bony abnormality. No change. The persistent consolidation and pleural effusion in the left chest. The right lung is unremarkable. Signed by Dr Conor Donohue on 11/16/2020 10:05 AM    Xr Chest Portable    Result Date: 11/15/2020  EXAM: XR CHEST PORTABLE -- 11/15/2020 4:40 AM HISTORY: 89 years, Female, status post left pleural effusion thoracentesis COMPARISON: 11/11/2020 TECHNIQUE:  1 images. Frontal view of the chest. FINDINGS:  Left hemithorax is completely opacified, increased compared to 11/11/2020. No convincing pneumothorax. Right lung with coarse interstitial opacities. No focal consolidation of the right lung. Cardiac mediastinal silhouette are limited in assessment due to technique. There appears to be mass effect of the left hemithorax on the trachea causing rightward deviation. No acute bony finding. 1. Complete opacification of the left lung, increased compared to 11/11/2020. There appears to be mass effect on the trachea. Although a large pleural effusion and pneumonia could cause this appearance, underlying mass is not excluded. Recommend correlation with prior outside imaging.  If none available, consider CT chest (preferably with contrast if patient's renal function permits). Signed by Dr Caralee Severin on 11/15/2020 6:42 AM    Xr Chest Portable    Result Date: 11/11/2020  Examination. XR CHEST PORTABLE 11/11/2020 8:01 AM History: Shortness of breath. A single frontal portable upright view of the chest is obtained. There is no previous study for comparison. There is a left lower lung consolidation and a large pleural effusion, free and loculated, in the left chest extending into the left apex. The right lung is poorly distended. No obvious abnormality, infiltrate or consolidation. The Heart size is not evaluated in this study. The distal trachea is displaced towards the right which is probably due to a tortuous and atheromatous thoracic aorta. There is no pneumothorax. Consolidation and pleural effusion in the left chest. The right lung is unremarkable. Signed by Dr Shyanne De La Torre on 11/11/2020 3:41 PM    Us Thoracentesis    Result Date: 11/15/2020  EXAM: US THORACENTESIS -- 11/15/2020 9:47 AM HISTORY: 89 years, Female, left pleural effusion COMPARISON: 11/15/2020, 11/11/2020 PROCEDURE: Risks, benefits and alternatives to the procedure were discussed with the patient. Verbal and written informed consent obtained. Time Out: Immediately prior to the procedure, a time out was performed. The patient's name, date of birth, and procedure (including laterality) to be performed were verified. Using ultrasound guidance, a site in the left posterior chest was selected and prepped in a sterile fashion. Lidocaine was used for local anesthesia. A 4 Uzbek one-step catheter was inserted into the left pleural space. 900 mL of serosanguinous fluid was withdrawn. The catheter was then withdrawn. A 50 mL syringe of fluid was obtained and held encases the referring team places orders for fluid analysis. The patient tolerated the procedure well. No evidence of complication.     Successful ultrasound-guided therapeutic left thoracentesis with drainage of 900 mL of serosanguinous pleural fluid. Signed by Dr Conrad Boas on 11/15/2020 11:24 AM    Us Thoracentesis    Result Date: 11/11/2020  EXAM: US THORACENTESIS -- 11/11/2020 1:12 PM HISTORY: 80 years, Female, left pleural effusion, pneumonia on chart review. COMPARISON: No existing relevant imaging studies available PROCEDURE: Risks, benefits and alternatives to the procedure were discussed with the patient. Written informed consent obtained. Time Out: Immediately prior to the procedure, a time out was performed. The patient's name, date of birth, and procedure (including laterality) to be performed were verified. Using ultrasound guidance, a site in the left posterior chest was selected and prepped in a sterile fashion. Lidocaine was used for local anesthesia. A 4 Yakut one-step catheter was inserted into the left pleural space. 1000 mL of serosanguineous fluid was withdrawn. The catheter was then withdrawn. The fluid was sent to the laboratory for analysis. The patient tolerated the procedure well. No evidence of complication. CT chest:  Successful ultrasound-guided diagnostic and therapeutic left thoracentesis with drainage of 1000 mL of serous sanguinous pleural fluid. Signed by Dr Conrad Boas on 11/11/2020 4:11 PM  IMPRESSION:  1. Findings suggest left-sided metastatic pleural disease with diffuse  masslike-pleural thickening and large malignant effusion throughout  the left pleural space. Resultant near complete left lung atelectasis. Unsure if this is a primary pleural malignancy with pleural spread or  perhaps secondary metastatic pleural disease. 2. Yusra metastases identified in the left hilum, subcarinal space,  left supraclavicular fossa as well as left retrocrural. Left  supraclavicular fossa adenopathy may be amenable to ultrasound-guided  needle aspiration if there is need for pathologic diagnosis.     My interpretation:    ASSESSMENT:  #Left lung mass/pleural mass/mediastinal adenopathy  Likely advanced malignancy. No pathologic diagnosis. Cytology from pleural fluid was nondiagnostic for malignancy and showed only reactive cells. In any case, the patient would not be a candidate for any therapeutic intervention such as chemotherapy. She has a very poor performance status and poor physical deconditioning. Patient already assessed by palliative care and leaning towards hospice. #Poor prognosis  #Poor physical deconditioning  #Palliative care patient  #Protein calorie malnutrition  #Complex medical patient  #Hypertension  #Diabetes mellitus type 2  #History of stroke  #Large left-sided pleural effusion    PLAN:  I agree with hospice. Patient is not a candidate for anticancer therapy due to poor performance status, malnutrition. I have seen, examined and reviewed this patient medication list, appropriate labs and imaging studies. I reviewed relevant medical records and others physicians notes. I discussed the plans of care with the patient. I answered all the questions to the patients satisfaction. I have also reviewed the chief complaint (CC) and part of the history (History of Present Illness (HPI), Past Family Social History [facility]), or Review of Systems (ROS) and made changes when appropriated.        (Please note that portions of this note were completed with a voice recognition program. Efforts were made to edit the dictations but occasionally words are mis-transcribed.)      Ariana Rojas MD    11/17/20  11:06 AM

## 2020-11-17 NOTE — CONSULTS
Palliative Care Consult Note  11/17/2020 2:10 PM  Subjective:   Admit Date: 11/11/2020  PCP: Nikhil Ramon    Reason For Consult: Goals of care,  Family Support    Requesting Physician: Dr. Con Stanley    History Obtained From:  EMR, nursing, patient's POA    Chief Complaint: Shortness of breath    History of Present Illness: Patient is an 66-year-old female with a PMH of DM, HTN, HLD, previous CVA, that presented to [facility] on 11/11/2020 as transfer from OSH due to large left pleural effusion, possible PNA. She was admitted and initiated on IV ABX, underwent US guided thoracentesis for 1L of fluid with reaccumulation and subsequent thoracentesis on 11/15 for 900 mL of fluid. She has been followed by pulmonology throughout hospitalization, CT of the chest completed yesterday strongly suggestive of metastatic process. Patient is frail with very poor performance status, would be a poor surgical candidate. Oncology consulted and following. Palliative care was consulted to assist with discussions regarding goals of care and provide family support. Past Medical History:        Diagnosis Date    Blood circulation, collateral     Cerebral artery occlusion with cerebral infarction ([facility] [address]     Diabetes mellitus ([facility] [address]     Hyperlipidemia     Hypertension     Palliative care patient 11/12/2020       Past Surgical History:        Procedure Laterality Date    CHOLECYSTECTOMY         Allergies:  Erythromycin; Niacin and related; Penicillins; and Sulfa antibiotics    Social History:    TOBACCO:   has no history on file for tobacco.  ETOH:   reports no history of alcohol use. Family History:   History reviewed. No pertinent family history.     Palliative Performance Score:  30-40%    Review of Systems   Difficult to obtain due to acuity of care, patient does endorse SOA, fatigue, weakness, and hearing loss    Palliative Review of Advance Directives:     Surrogate Decision Maker:yes, Julee Fritz    Durable Power of :no    Advanced Directives/Living Vuong: no    Out of hospital medical orders in place to reflect resuscitation status (MOLST/POLST): no    Information Sharing:  Patient's awareness of illness:  [] Terminal [] Life-Threatening [x] Serious [] Non life-threatening [] Not serious   [] Not discussed    Family awareness of illness:   [] Terminal [] Life-Threatening [x] Serious [] Nonlife-threatening [] Not serious   [] Not discussed    DIET DYSPHAGIA SOFT AND BITE-SIZED; Mildly Thick (Nectar)    Medications:   dilTIAZem (CARDIZEM) 125 mg in dextrose 5% 125 mL infusion Stopped (11/17/20 1317)    sodium chloride 75 mL/hr (11/13/20 1154)    dextrose       Current Facility-Administered Medications   Medication Dose Route Frequency Provider Last Rate Last Dose    dilTIAZem (CARDIZEM) tablet 120 mg  120 mg Oral 2 times per day Romina Osorio MD   120 mg at 11/17/20 3836    potassium chloride (KLOR-CON M) extended release tablet 40 mEq  40 mEq Oral PRN Joey Marroquin MD        Or    potassium bicarb-citric acid (EFFER-K) effervescent tablet 40 mEq  40 mEq Oral PRN Joey Marroquin MD   40 mEq at 11/15/20 0615    Or    potassium chloride 10 mEq/100 mL IVPB (Peripheral Line)  10 mEq Intravenous PRN Joey Marroquin  mL/hr at 11/16/20 1827 10 mEq at 11/16/20 1827    magnesium sulfate 1 g in dextrose 5% 100 mL IVPB  1 g Intravenous PRN Joey Marroquin MD   Stopped at 11/16/20 0643    insulin glargine (LANTUS) injection vial 10 Units  10 Units Subcutaneous Daily Nahun Gray DO   Stopped at 11/17/20 1103    piperacillin-tazobactam (ZOSYN) 3.375 g in dextrose 5 % 50 mL IVPB extended infusion (mini-bag)  3.375 g Intravenous Q8H Cooper Poe MD   Stopped at 11/17/20 1244    dilTIAZem 125 mg in dextrose 5 % 125 mL infusion  5 mg/hr Intravenous Continuous Davidson Thorpe, DO   Stopped at 11/17/20 1317    guaiFENesin-dextromethorphan (ROBITUSSIN DM) 100-10 MG/5ML syrup 5 mL  5 mL Oral Q4H PRN Alfonse Coma 417* 357 345     Recent Labs     11/15/20  0213 11/16/20  0157 11/17/20  0132    138 137   K 3.1* 3.0* 3.6   ANIONGAP 13 8 10    102 101   CO2 23 28 26   BUN 15 16 15   CREATININE 0.8 0.7 0.7   GLUCOSE 170* 121* 119*   CALCIUM 9.3 9.3 8.8     Recent Labs     11/15/20  0213 11/16/20  0157   MG 1.4* 1.4*     No results for input(s): AST, ALT, ALB, BILITOT, ALKPHOS, ALB in the last 72 hours. ABGs:No results for input(s): PHART, AXJ9ILQ, PO2ART, QLG3CKS, BEART, HGBAE, T9ACXWHV, CARBOXHGBART, 02THERAPY in the last 72 hours. HgBA1c: No results for input(s): LABA1C in the last 72 hours. FLP:  No results found for: TRIG, HDL, LDLCALC, LDLDIRECT, LABVLDL  TSH:    Lab Results   Component Value Date    TSH 1.700 11/13/2020     Troponin T: No results for input(s): TROPONINI in the last 72 hours.   INR:   Recent Labs     11/15/20  0944   INR 1.50*       Objective:   Vitals: BP (!) 140/73   Pulse 92   Temp 96.7 °F (35.9 °C) (Temporal)   Resp 18   Ht 5' 7\" (1.702 m)   Wt 124 lb 3.2 oz (56.3 kg)   SpO2 94%   BMI 19.45 kg/m²   24HR INTAKE/OUTPUT:      Intake/Output Summary (Last 24 hours) at 11/17/2020 1410  Last data filed at 11/17/2020 1312  Gross per 24 hour   Intake 377.5 ml   Output 250 ml   Net 127.5 ml     Physical Exam      General appearance: alert, frail, appears ill, no acute distress  HEENT: atraumatic, eyes with clear conjunctiva and normal lids, pupils and irises normal, external ears and nose are normal,lips normal.  Neck: without masses, supple   Lungs: no increased work of breathing, diminished bilaterally, nasal cannula in place  Heart: regular rate and rhythm and S1, S2 normal  Abdomen: soft, non-tender; bowel sounds normal; no masses,  no organomegaly  Genitourinary: No bladder fullness, masses, or tenderness  Extremities: atraumatic, trace pedal edema bilaterally  Neurologic: No focal neurologic deficits, normal sensation, alert to self, situation, able to follow commands  Psychiatric: no agitation or anxiety, flat affect  Skin: warm, dry    Assessment and Plan:   Principal Problem:    Sepsis ([facility] [address]  Active Problems:    Pneumonia    Palliative care patient    Pleural effusion    Hyponatremia    Normocytic anemia    Type 2 diabetes mellitus ([address]    History of stroke with residual deficit    Dementia (HCC)    Hypocalcemia    Essential hypertension    Supraventricular tachycardia ([facility] [address]  Resolved Problems:    * No resolved hospital problems. *      Visit Summary: I saw the patient at the bedside, she is quite frail and appears ill and fatigued. I did discuss palliative care and reason for consult with her. She verbalized minimally and would mainly nod her head to answer questions, drifted off easily during discussion, and deferred decisions and further discussion to her granddaughter, Lacey Castañeda, who is her POA when asked. I contacted Chet Bailey and spoke to her by phone (37 min) to review patient's current status, information from CT scan, pulmonology, and informed her of oncology consult. Chet Bailey stated that she was anticipating a possible cancerous process due to the quick recurrence of effusion. She had been trying to prepare for possible \"bad news\" and had previous discussions with the patient regarding wishes for care. I did discuss information from pulmonology note with options to pursue aggressive work-up although patient would likely be a poor candidate due to her poor performance status, which would also likely make her a poor candidate for treatment from oncology. Patient's granddaughter acknowledges this and states that the patient would not want to pursue aggressive intervention and has told her that when it is \"her time to go, to let her go. \"  I also discussed the ability to have hospice care at the facility or be evaluated for inpatient hospice if the patient becomes increasingly symptomatic, which may occur if effusion reaccumulates as it did previously.   Chet Bailey would like to discuss this information with her family and plans to return call with further decisions regarding goals of care but appears to be leaning strongly toward hospice care. If patient declines or becomes increasingly symptomatic, Josh Gonzalez is aware that evaluation for inpatient hospice can occur at any time. She was appreciative of the call and information and I will continue to follow and assist with further this questions on goals of care. Nursing staff informed of the outcome of my visit. Recommendations:  1. Consideration for hospice care at facility. Can evaluate for inpatient hospice if she requires increased symptom management. Thank you for consulting palliative care and allowing us to participate in the care of the patient.       CounselingTopics: Goals of care, Disease process education, pt/family support    Time Spent Counselin min                                        Total Face to Face Time: 20 min  Time Spent Reviewing Records/Provider Communication: 15 min  Total Time Spent: 72 min    Electronically signed by SAGE Molina on 2020 at 2:10 PM    (Please note that portions of this note were completed with a voice recognition program.  Efforts were made to edit the dictations but occasionally words are mis-transcribed.)

## 2020-11-17 NOTE — PROGRESS NOTES
Speech Language Pathology  Facility/Department: [facility] PROGRESSIVE CARE  SWALLOW THERAPY     NAME: Parker Rm  : 1931  MRN: [id number]    ADMISSION DATE: 2020  ADMITTING DIAGNOSIS: has Pneumonia; Palliative care patient; Pleural effusion; Sepsis ([address] Hyponatremia; Normocytic anemia; Type 2 diabetes mellitus ([address] History of stroke with residual deficit; Dementia ([address] Hypocalcemia; Essential hypertension; and Supraventricular tachycardia ([facility] [address] on their problem list.    Date of Treat: 2020  Evaluating Therapist: Dia Koenig    Current Diet level:  Puree consistency with nectar thick liquids     Reason for Referral  Parker mR was referred for a bedside swallow evaluation to assess the efficiency of her swallow function, identify signs and symptoms of aspiration and make recommendations regarding safe dietary consistencies, effective compensatory strategies, and safe eating environment. Impression  Re-assessed patient's swallowing function. Patient exhibits slow, decreased oral prep and decreased oral transit of more solid consistencies (bite sized consistency residue was slow but cleared with additional dry swallows). Patient also exhibits sluggish, mild-moderately decreased laryngeal elevation for swallow airway protection. Even so, no outward S/S penetration/aspiration was noted with any puree consistency presentation or bite sized consistency trial administered during treatment session this date. Just mild delayed coughs were observed with nectar thick liquid presentations. At this time, would trial ground meats with extra sauces/gravy and soft sides. Continue nectar thick liquids. Continue meds crushed in pudding/applesauce. Will continue to follow.      Treatment Plan  Requires SLP Intervention: Yes     Recommended Diet and Intervention  Diet Solids Recommendation: Ground meats with extra sauces/gravy and soft sides  Liquid Consistency Recommendation: Nectar thick Recommended Form of Meds: Meds crushed in puree as able  Therapeutic Interventions: Patient/Family education;Diet tolerance monitoring; Therapeutic PO trials with SLP     Compensatory Swallowing Strategies  Compensatory Swallowing Strategies: Upright as possible for all oral intake;Small bites/sips;Eat/Feed slowly; Alternate solids and liquids; Remain upright for 30-45 minutes after meals     Treatment/Goals  Timeframe for Short-term Goals: 1x/day for 3 days   Goal 1: Patient will tolerate ground meats with sauces/gravy and soft sides with nectar thick liquids with min S/S penetration/aspiration during PO intake. Goal 2: Patient staff will follow swallow safety recommendations to decrease risk of penetration/aspiration during PO intake. Goal 3: Re-assessment of swallow function for potential diet upgrade. General  Chart Reviewed: Yes  Behavior/Cognition: Alert; Cooperative  O2 Device: Nasal Cannula   Communication Observation: (Weak voicing was noted during verbalizations.)  Follows Directions: Simple   Patient Positioning: Upright in bed  Consistencies Administered: Dysphagia Pureed (Dysphagia I); Bite sized; Nectar - cup    Re-assessed patient's swallowing function with the following observations noted:     Oral Phase  Mastication: Bite sized (Patient exhibited slow oral prep with decreased rotary jaw movement noted during bite sized consistency trials presented by SLP.)  Suspected Premature Bolus Loss: Puree;Bite sized (Patient exhibited slow oral transit of bite sized consistency presentations and puree consistency presentations-administered by PCA and RN-with tongue thrusting noted during oral transit.)  Decreased Oral Transit: Bite sized (Min-moderate oral cavity residue was noted post swallows; residue was slow but cleared from the mouth with additional dry swallows.   Oral Phase - Comment: Oral transit of nectar thick liquid presentations, administered via cup by PCA, primarily measured 1-2 seconds in length. Pharyngeal Phase  Suspected Swallow Delay: Puree;Bite sized (Suspect secondary to oral transit times.)  Decreased Laryngeal Elevation: (Patient exhibited sluggish, mild-moderately decreased laryngeal elevation for swallow airway protection.)  Delayed Cough: Nectar - cup   Pharyngeal Phase - Comment: No outward S/S penetration/aspiration was noted with any puree consistency presentation or bite sized consistency trial administered during treatment session this date. Just mild delayed coughs were observed with nectar thick liquid presentations. At this time, would trial ground meats with extra sauces/gravy and soft sides. Continue nectar thick liquids. Continue meds crushed in pudding/applesauce. Will continue to follow.     Electronically signed by MALISSA Albert on 11/17/2020 at 1:07 PM

## 2020-11-17 NOTE — PROGRESS NOTES
Hospitalist Progress Note  11/17/2020 9:28 AM  Subjective:   Admit Date: 11/11/2020  PCP: Kevin Haines    Chief Complaint: shortness of breath, pleural effusion    Subjective: Patient remembers CT yesterday, states she is not sure what else has been done with her since she was admitted. I informed her that CT yesterday was suspicious for cancer, to which she had little reaction. Will consult palliative care as she has previously seemed to express interest in not pursuing aggressive measures. She is coughing up a large amount of yellowish white sputum. History is otherwise unchanged. Cumulative Hospital History:   11-11: Presented overnight from OSH ED where she was taken with shortness of breath. Large pleural effusion on left discovered and they were unable to perform thoracentesis in ED and thus was transferred for this and for pulmonology consult. Patient demented, unable to provide history. Admitted to med/surg on levofloxacin, piperacillin/tazobactam, and vancomycin. Underwent US-guided thoracentesis. Pulmonology consulted. 11-12: Still very confused but seems to be breathing more easily now. Will need to contact family to determine her baseline mental status. 11-13: Nauseated and coughing up small amounts of thick sputum. Feels worse today. Per family, she is sharp at her baseline. Tachycardic with long runs of SVT to 180s. Transferred to PCU with diltiazem bolus and drip ordered. Cardiology consulted. Guaifenesin/dextromethorphan to thin secretions. 11-14: Oral diltiazem, wean from drip. Levofloxacin and vancomycin stopped by pulmonology. Improving, possibly home soon. 11-15: Significantly increased left pleural effusion, repeat thoracentesis done with 900 mL removed. More short of breath and lethargic today. Discussed with pulmonology, will be started on steroids. Possibility that she will need VATS. 11-16: No significant change in radiographic appearance.  Pulmonology is ordering CT, may require CT surgery consult, though she is a DNR and a poor surgical candidate so unsure what their surgical offerings would be.  11-17: CT yesterday strongly suggestive of cancer. I informed the patient but she was not able to give me any indication at this time whether she wishes to pursue further investigation or treatment. Have consulted palliative care as she had previously seemed to express interest in not pursing aggressive measures. Coughing up a large amount of yellowish white sputum. ROS: 14 point review of systems is negative except as specifically addressed above.     DIET DYSPHAGIA PUREED; Mildly Thick (Nectar)    Intake/Output Summary (Last 24 hours) at 11/17/2020 0928  Last data filed at 11/17/2020 0838  Gross per 24 hour   Intake 340 ml   Output 250 ml   Net 90 ml     Medications:   dilTIAZem (CARDIZEM) 125 mg in dextrose 5% 125 mL infusion 5 mg/hr (11/15/20 0615)    sodium chloride 75 mL/hr (11/13/20 1154)    dextrose       Current Facility-Administered Medications   Medication Dose Route Frequency Provider Last Rate Last Dose    dilTIAZem (CARDIZEM) tablet 120 mg  120 mg Oral 2 times per day Stephanie Cantrell MD   120 mg at 11/17/20 5241    potassium chloride (KLOR-CON M) extended release tablet 40 mEq  40 mEq Oral PRN Kam Gray MD        Or    potassium bicarb-citric acid (EFFER-K) effervescent tablet 40 mEq  40 mEq Oral PRN Kam Gray MD   40 mEq at 11/15/20 0615    Or    potassium chloride 10 mEq/100 mL IVPB (Peripheral Line)  10 mEq Intravenous PRN Kam Gray  mL/hr at 11/16/20 1827 10 mEq at 11/16/20 1827    magnesium sulfate 1 g in dextrose 5% 100 mL IVPB  1 g Intravenous PRN Kam Gray MD   Stopped at 11/16/20 0643    insulin glargine (LANTUS) injection vial 10 Units  10 Units Subcutaneous Daily Davidson Thorpe, DO   10 Units at 11/16/20 0915    piperacillin-tazobactam (ZOSYN) 3.375 g in dextrose 5 % 50 mL IVPB extended infusion (mini-bag)  3.375 g Intravenous Q8H Gold Umaña MD 12.5 mL/hr at 11/17/20 0844 3.375 g at 11/17/20 0844    dilTIAZem 125 mg in dextrose 5 % 125 mL infusion  5 mg/hr Intravenous Continuous Davidson Thorpe, DO 5 mL/hr at 11/15/20 0615 5 mg/hr at 11/15/20 0615    guaiFENesin-dextromethorphan (ROBITUSSIN DM) 100-10 MG/5ML syrup 5 mL  5 mL Oral Q4H PRN Nirmala Raphael DO        0.9 % sodium chloride infusion   Intravenous Continuous Gold Umaña MD 75 mL/hr at 11/13/20 1154 75 mL/hr at 11/13/20 1154    sodium chloride flush 0.9 % injection 10 mL  10 mL Intravenous 2 times per day Gold Umaña MD   10 mL at 11/17/20 0840    sodium chloride flush 0.9 % injection 10 mL  10 mL Intravenous PRN Gold Umaña MD        acetaminophen (TYLENOL) tablet 650 mg  650 mg Oral Q6H PRN Gold Umaña MD   650 mg at 11/11/20 1948    Or    acetaminophen (TYLENOL) suppository 650 mg  650 mg Rectal Q6H PRN Gold Umaña MD        polyethylene glycol (GLYCOLAX) packet 17 g  17 g Oral Daily PRN Gold Umaña MD        promethazine (PHENERGAN) tablet 12.5 mg  12.5 mg Oral Q6H PRN oGld Umaña MD        Or    ondansetron [facility]) injection 4 mg  4 mg Intravenous Q6H PRN Gold Umaña MD   4 mg at 11/12/20 1053    ipratropium-albuterol (DUONEB) nebulizer solution 1 ampule  1 ampule Inhalation Q4H WA Gold Umaña MD   1 ampule at 11/17/20 0634    insulin lispro (HUMALOG) injection vial 0-6 Units  0-6 Units Subcutaneous TID WC Gold Umaña MD   2 Units at 11/15/20 1704    insulin lispro (HUMALOG) injection vial 0-3 Units  0-3 Units Subcutaneous Nightly Gold Umaña MD   1 Units at 11/16/20 2045    glucose (GLUTOSE) 40 % oral gel 15 g  15 g Oral PRN Gold Umaña MD        dextrose 50 % IV solution  12.5 g Intravenous PRN Gold Umaña MD        glucagon (rDNA) injection 1 mg  1 mg Intramuscular PRN MD Albertina Nunez See dextrose 5 % solution  100 mL/hr Intravenous SADAF Nice MD            Labs:     Recent Labs     11/15/20  0213 11/16/20  0157 11/17/20  0132   WBC 14.7* 13.7* 12.8*   RBC 3.25* 3.10* 3.32*   HGB 9.2* 8.9* 9.4*   HCT 29.3* 27.4* 29.9*   MCV 90.2 88.4 90.1   MCH 28.3 28.7 28.3   MCHC 31.4* 32.5* 31.4*   * 357 345     Recent Labs     11/15/20  0213 11/16/20  0157 11/17/20  0132    138 137   K 3.1* 3.0* 3.6   ANIONGAP 13 8 10    102 101   CO2 23 28 26   BUN 15 16 15   CREATININE 0.8 0.7 0.7   GLUCOSE 170* 121* 119*   CALCIUM 9.3 9.3 8.8     Recent Labs     11/15/20  0213 11/16/20  0157   MG 1.4* 1.4*     No results for input(s): AST, ALT, ALB, BILITOT, ALKPHOS, ALB in the last 72 hours. ABGs:No results for input(s): PH, PO2, PCO2, HCO3, BE, O2SAT in the last 72 hours. Troponin T:   No results for input(s): TROPONINI in the last 72 hours. INR:   Recent Labs     11/15/20  0944   INR 1.50*     Lactic Acid: No results for input(s): LACTA in the last 72 hours.     Objective:   Vitals: BP (!) 140/73   Pulse 79   Temp 96.7 °F (35.9 °C) (Temporal)   Resp 18   Ht 5' 7\" (1.702 m)   Wt 124 lb 3.2 oz (56.3 kg)   SpO2 94%   BMI 19.45 kg/m²   24HR INTAKE/OUTPUT:      Intake/Output Summary (Last 24 hours) at 11/17/2020 2806  Last data filed at 11/17/2020 0432  Gross per 24 hour   Intake 340 ml   Output 250 ml   Net 90 ml     General appearance: alert and cooperative with exam  HEENT: atraumatic, eyes with clear conjunctiva and normal lids, pupils and irises normal, external ears and nose are normal, lips normal  Neck without masses, lympadenopathy, bruit, thyroid normal  Lungs: no increased work of breathing, diminished breath sounds LLL and wheezes bilaterally  Heart: irregularly irregular, no murmur  Abdomen: soft, non-tender; bowel sounds normal; no masses,  no organomegaly  Extremities: extremities normal, atraumatic, no cyanosis or edema  Neurologic: normal sensation, alert but very confused, has memorized the year, flattened affect  Skin: no rashes, nodules    Assessment and Plan:   Principal Problem:    Sepsis ([facility] [address]  Active Problems:    Pneumonia    Palliative care patient    Pleural effusion    Hyponatremia    Normocytic anemia    Type 2 diabetes mellitus ([facility] [address]    History of stroke with residual deficit    Dementia (HCC)    Hypocalcemia    Essential hypertension    Supraventricular tachycardia ([facility] [address]  Resolved Problems:    * No resolved hospital problems. *      POD #6 and #2 thoracenteses. Day #7 piperacillin/tazobactam empiric therapy for pneumonia. Levofloxacin and vancomycin discontinued by pulmonology. Cultures pending to guide therapy. Still on diltiazem drip due to inability to take her oral diltiazem this morning and last night. CT strongly suggestive of cancer. Consult oncology and palliative care, do not believe she will want to pursue biopsy or treatment. Pulmonology onboard. Cardiology onboard.     Advance Directive: DNR-CC    DVT prophylaxis: SCDs    Discharge planning: SCOTT Sinclair DO  Rounding Hospitalist

## 2020-11-17 NOTE — TELEPHONE ENCOUNTER
Gayathri consult  Tripp Colon [birth year]  Room: 722  Metastatic cancer  Dr. Hussain Oliver   Nurse is Danny Blanco

## 2020-11-17 NOTE — PROGRESS NOTES
no pericardial effusion. Vessels: Thoracic aorta is normal in caliber. Mild calcified plaque in    the arch. The central pulmonary arteries are nondilated. Lymph nodes: Left hilar and subcarinal adenopathy is identified. Left    supraclavicular fossa adenopathy is also identified. The largest left    supraclavicular fossa node measures up to 1.3 cm short axis. Left-sided retrocrural adenopathy is also identified. Skeletal and soft tissues: The bones are diffusely osteopenic. Spinal    scoliotic curvature. There is a notable T10 vertebral body hemangioma. Mild chronic compression deformity at L2. Upper abdomen: The imaged portion of the upper abdomen demonstrates no    acute process.         Impression    1. Findings suggest left-sided metastatic pleural disease with diffuse    masslike-pleural thickening and large malignant effusion throughout    the left pleural space. Resultant near complete left lung atelectasis. Unsure if this is a primary pleural malignancy with pleural spread or    perhaps secondary metastatic pleural disease. 2. Yusra metastases identified in the left hilum, subcarinal space,    left supraclavicular fossa as well as left retrocrural. Left    supraclavicular fossa adenopathy may be amenable to ultrasound-guided    needle aspiration if there is need for pathologic diagnosis. Signed by Dr Lizzy Chawla on 11/16/2020 3:42 PM        My radiograph interpretation: Persistent effusion, atelectasis and pleural disease, adenopathy in the hilar and mediastinal areas as well    Pulmonary Assessment:    1. Acute hypoxic respiratory failure, stable to improved  2. Large left-sided pleural effusion  3. Status post ultrasound-guided thoracentesis  4. Possible heart failure versus parapneumonic effusion  5. Community-acquired pneumonia  6. Hypertension  7. Hyperlipidemia  8. Diabetes mellitus  9. History of stroke  10.  Dysphagia    Recommend:     · CT showing pleural effusion,

## 2020-11-18 ENCOUNTER — OUTSIDE FACILITY SERVICE (OUTPATIENT)
Dept: PULMONOLOGY | Facility: CLINIC | Age: 85
End: 2020-11-18

## 2020-11-18 LAB
ANION GAP SERPL CALCULATED.3IONS-SCNC: 11 MMOL/L (ref 7–19)
BUN BLDV-MCNC: 14 MG/DL (ref 8–23)
CALCIUM SERPL-MCNC: 8.8 MG/DL (ref 8.8–10.2)
CHLORIDE BLD-SCNC: 99 MMOL/L (ref 98–111)
CO2: 27 MMOL/L (ref 22–29)
CREAT SERPL-MCNC: 0.6 MG/DL (ref 0.5–0.9)
GFR AFRICAN AMERICAN: >59
GFR NON-AFRICAN AMERICAN: >60
GLUCOSE BLD-MCNC: 101 MG/DL (ref 70–99)
GLUCOSE BLD-MCNC: 105 MG/DL (ref 70–99)
GLUCOSE BLD-MCNC: 128 MG/DL (ref 70–99)
GLUCOSE BLD-MCNC: 159 MG/DL (ref 74–109)
GLUCOSE BLD-MCNC: 177 MG/DL (ref 70–99)
HCT VFR BLD CALC: 29.9 % (ref 37–47)
HEMOGLOBIN: 9.5 G/DL (ref 12–16)
MCH RBC QN AUTO: 28.2 PG (ref 27–31)
MCHC RBC AUTO-ENTMCNC: 31.8 G/DL (ref 33–37)
MCV RBC AUTO: 88.7 FL (ref 81–99)
PDW BLD-RTO: 13.7 % (ref 11.5–14.5)
PERFORMED ON: ABNORMAL
PLATELET # BLD: 320 K/UL (ref 130–400)
PMV BLD AUTO: 9.4 FL (ref 9.4–12.3)
POTASSIUM REFLEX MAGNESIUM: 3.6 MMOL/L (ref 3.5–5)
RBC # BLD: 3.37 M/UL (ref 4.2–5.4)
SODIUM BLD-SCNC: 137 MMOL/L (ref 136–145)
WBC # BLD: 13.3 K/UL (ref 4.8–10.8)

## 2020-11-18 PROCEDURE — 2580000003 HC RX 258: Performed by: HOSPITALIST

## 2020-11-18 PROCEDURE — 6370000000 HC RX 637 (ALT 250 FOR IP): Performed by: INTERNAL MEDICINE

## 2020-11-18 PROCEDURE — 85027 COMPLETE CBC AUTOMATED: CPT

## 2020-11-18 PROCEDURE — 36415 COLL VENOUS BLD VENIPUNCTURE: CPT

## 2020-11-18 PROCEDURE — 6370000000 HC RX 637 (ALT 250 FOR IP): Performed by: HOSPITALIST

## 2020-11-18 PROCEDURE — 99232 SBSQ HOSP IP/OBS MODERATE 35: CPT | Performed by: NURSE PRACTITIONER

## 2020-11-18 PROCEDURE — 6370000000 HC RX 637 (ALT 250 FOR IP): Performed by: FAMILY MEDICINE

## 2020-11-18 PROCEDURE — 99232 SBSQ HOSP IP/OBS MODERATE 35: CPT | Performed by: INTERNAL MEDICINE

## 2020-11-18 PROCEDURE — 2700000000 HC OXYGEN THERAPY PER DAY

## 2020-11-18 PROCEDURE — 94640 AIRWAY INHALATION TREATMENT: CPT

## 2020-11-18 PROCEDURE — 1210000000 HC MED SURG R&B

## 2020-11-18 PROCEDURE — 6360000002 HC RX W HCPCS: Performed by: HOSPITALIST

## 2020-11-18 PROCEDURE — 80048 BASIC METABOLIC PNL TOTAL CA: CPT

## 2020-11-18 PROCEDURE — 99231 SBSQ HOSP IP/OBS SF/LOW 25: CPT | Performed by: INTERNAL MEDICINE

## 2020-11-18 PROCEDURE — 82947 ASSAY GLUCOSE BLOOD QUANT: CPT

## 2020-11-18 RX ADMIN — PIPERACILLIN AND TAZOBACTAM 3.38 G: 3; .375 INJECTION, POWDER, LYOPHILIZED, FOR SOLUTION INTRAVENOUS at 17:06

## 2020-11-18 RX ADMIN — Medication 10 ML: at 08:38

## 2020-11-18 RX ADMIN — PIPERACILLIN AND TAZOBACTAM 3.38 G: 3; .375 INJECTION, POWDER, LYOPHILIZED, FOR SOLUTION INTRAVENOUS at 01:35

## 2020-11-18 RX ADMIN — INSULIN LISPRO 1 UNITS: 100 INJECTION, SOLUTION INTRAVENOUS; SUBCUTANEOUS at 08:38

## 2020-11-18 RX ADMIN — IPRATROPIUM BROMIDE AND ALBUTEROL SULFATE 1 AMPULE: .5; 3 SOLUTION RESPIRATORY (INHALATION) at 14:38

## 2020-11-18 RX ADMIN — PIPERACILLIN AND TAZOBACTAM 3.38 G: 3; .375 INJECTION, POWDER, LYOPHILIZED, FOR SOLUTION INTRAVENOUS at 08:37

## 2020-11-18 RX ADMIN — Medication 10 ML: at 21:11

## 2020-11-18 RX ADMIN — IPRATROPIUM BROMIDE AND ALBUTEROL SULFATE 1 AMPULE: .5; 3 SOLUTION RESPIRATORY (INHALATION) at 07:16

## 2020-11-18 RX ADMIN — IPRATROPIUM BROMIDE AND ALBUTEROL SULFATE 1 AMPULE: .5; 3 SOLUTION RESPIRATORY (INHALATION) at 18:48

## 2020-11-18 RX ADMIN — Medication 10 UNITS: at 08:37

## 2020-11-18 RX ADMIN — IPRATROPIUM BROMIDE AND ALBUTEROL SULFATE 1 AMPULE: .5; 3 SOLUTION RESPIRATORY (INHALATION) at 10:43

## 2020-11-18 RX ADMIN — DILTIAZEM HYDROCHLORIDE 120 MG: 30 TABLET, FILM COATED ORAL at 21:10

## 2020-11-18 ASSESSMENT — ENCOUNTER SYMPTOMS
COUGH: 1
SHORTNESS OF BREATH: 1
CHOKING: 1

## 2020-11-18 ASSESSMENT — PAIN SCALES - GENERAL
PAINLEVEL_OUTOF10: 0

## 2020-11-18 NOTE — PROGRESS NOTES
PROGRESS NOTE    Patient name: Grayson Patel  Patient : 1931  Room:  722      SUBJECTIVE:  Anticipating Hospice    INTERVAL HISTORY  The patient is a 80year old female whom I was being consulted for findings of likely malignancy involving the left lung and pleural space. Unfortunately, poor historian. No family member available. Most of information was retrieved from reviewing medical records. Essentially, she has multiple comorbidities to include a history of diabetes mellitus type 2, hypertension, hyperlipidemia, previous CVA, physical deconditioning. She presented to the ER on 2020 as a transfer from outside hospital due to findings of a large left pleural effusion. Apparently patient has been had complaints of shortness of breath for a while. She was initially diagnosed with possible pneumonia. She was admitted and started on IV antibiotics. An ultrasound-guided thoracocentesis was performed and about a liter of fluid was taken out. She had a second thoracentesis performed 11/15/2020 with drainage of another 900 cc of fluid. The cytology did not reveal any malignant cells but reactive mesothelial cells. Pulmonary has been consulted and is following the patient. She was also seen by cardiology for supraventricular tachycardia. She has a very poor performance status, malnourished and likely confusional state due to may be dementia. A CT chest performed here showed findings suggesting left side metastatic pleural disease with diffuse masslike-pleural thickening and large malignant effusion throughout the left pleural space. This resulted in a near complete left lung atelectasis. There was a concern for malignancy. In addition, bo metastasis identified in the left hilum, subcarinal space, left supraclavicular fossa as well as left retrocrural.  In this context, I was consulted. Apparently, the patient was seen by palliative care today.   Palliative care talk to the family who agreed that the patient was not a candidate for further diagnostic or therapeutic intervention. Apparently, they were leaning towards consideration of hospice care at facility.     11/15/2020-CT chest showed  1. Findings suggest left-sided metastatic pleural disease with diffuse  masslike-pleural thickening and large malignant effusion throughout  the left pleural space. Resultant near complete left lung atelectasis. Unsure if this is a primary pleural malignancy with pleural spread or  perhaps secondary metastatic pleural disease. 2. Yusra metastases identified in the left hilum, subcarinal space,  left supraclavicular fossa as well as left retrocrural. Left  supraclavicular fossa adenopathy may be amenable to ultrasound-guided  needle aspiration if there is need for pathologic diagnosis. Subjective   REVIEW OF SYSTEMS:   Review of Systems   Unable to perform ROS: Mental status change       Objective   /70   Pulse 79   Temp 98.2 °F (36.8 °C) (Temporal)   Resp 16   Ht 5' 7\" (1.702 m)   Wt 123 lb (55.8 kg)   SpO2 91%   BMI 19.26 kg/m²     PHYSICAL EXAM:  Physical Exam  Constitutional:       General: She is not in acute distress. Cardiovascular:      Rate and Rhythm: Normal rate and regular rhythm. Pulmonary:      Breath sounds: Decreased air movement (left lung) present. Abdominal:      General: Abdomen is flat. Bowel sounds are normal.      Palpations: Abdomen is soft. Neurological:      Mental Status: She is confused.            Recent Labs     11/18/20  0225 11/17/20  0132 11/16/20  0157   WBC 13.3* 12.8* 13.7*   HGB 9.5* 9.4* 8.9*   HCT 29.9* 29.9* 27.4*   MCV 88.7 90.1 88.4    345 357       Lab Results   Component Value Date     11/18/2020    K 3.6 11/18/2020    CL 99 11/18/2020    CO2 27 11/18/2020    BUN 14 11/18/2020    CREATININE 0.6 11/18/2020    GLUCOSE 159 (H) 11/18/2020    CALCIUM 8.8 11/18/2020    PROT 6.2 (L) 11/11/2020    LABALBU 2.8 (L) 11/11/2020    BILITOT 0.4 Left    supraclavicular fossa adenopathy is also identified. The largest left    supraclavicular fossa node measures up to 1.3 cm short axis. Left-sided retrocrural adenopathy is also identified. Skeletal and soft tissues: The bones are diffusely osteopenic. Spinal    scoliotic curvature. There is a notable T10 vertebral body hemangioma. Mild chronic compression deformity at L2. Upper abdomen: The imaged portion of the upper abdomen demonstrates no    acute process.         Impression    1. Findings suggest left-sided metastatic pleural disease with diffuse    masslike-pleural thickening and large malignant effusion throughout    the left pleural space. Resultant near complete left lung atelectasis. Unsure if this is a primary pleural malignancy with pleural spread or    perhaps secondary metastatic pleural disease. 2. Yusra metastases identified in the left hilum, subcarinal space,    left supraclavicular fossa as well as left retrocrural. Left    supraclavicular fossa adenopathy may be amenable to ultrasound-guided    needle aspiration if there is need for pathologic diagnosis. Signed by Dr Sena Pike on 11/16/2020 3:42 PM               ASSESSMENT/PLAN:  #Left lung mass/pleural mass/mediastinal adenopathy        Likely advanced malignancy. No pathologic diagnosis. Cytology from pleural fluid was nondiagnostic for malignancy and showed only reactive cells. In any case, the patient would not be a candidate for any therapeutic intervention such as chemotherapy. She has a very poor performance status and poor physical deconditioning. Patient already assessed by palliative care and leaning towards hospice.     #Poor prognosis  #Poor physical deconditioning  #Palliative care patient  #Protein calorie malnutrition  #Complex medical patient  #Hypertension  #Diabetes mellitus type 2  #History of stroke  #Large left-sided pleural effusion     PLAN:  Patient is not a candidate for anticancer therapy due to poor performance status, malnutrition. Seen by palliative care and under evaluation for inpatient hospice. Denver Bouton, PA-C    11/18/20  7:06 AM  Physician's attestation/substantial contribution:  I, Dr Elizabeth Ceballos, independently performed an evaluation on Martell Romo. I have reviewed relevant medical information/data to include but not limited to medication list, relevant appropriate labs and imaging when applicable. I reviewed other physician's notes, ancillary services and nurses assessment. I have reviewed the above documentation completed by the Nurse Practitioner or Physician Assistant. Please see my additional contributions to the history of present illness, physical examination, and assessment/medical decision-making that reflect my findings and impressions. I discussed essential elements of the care plan with the NP or PA. I concur with above stated. Subjective- sleepy this morning. [id number]  Assessment/plan:  Left pleural mass- patient has a very poor performance. Not a candidate for anticancer therapy. Discussed with palliative care.     Elizabeth Ceballos MD

## 2020-11-18 NOTE — PROGRESS NOTES
Jonathanece Saliva transferred to 326 from 72 2via bed. Reason for transfer:to  medical floor   Explained reason for transfer to Family. Belongings: Hearing Aides bilateral, clothing, glasses, upper and lower dentures with patient at bedside . Soft chart transferred with patient: Yes. Telemetry box number N/A transferred with patient: N/A. Report given to: Ata Weinberg RN, via telephone.       Electronically signed by Beckie Buitrago RN on 11/18/2020 at 5:13 PM

## 2020-11-18 NOTE — PROGRESS NOTES
Hospitalist Progress Note  11/18/2020 1:19 PM  Subjective:   Admit Date: 11/11/2020  PCP: Alice Wild    Chief Complaint: Large plural effusion     Subjective: resting comfortably. Family wishes to pursue hospice care, will need facility placement. Weaned off of diltiazem gtt. [facility]:   Admitted with recurrent left pleural effusion with subsequent AHRF, believed to be parapneumonic. S/p US guided thoracenteses. CT showing pleural effusion, atelectasis, adenopathy and pleura disease that is highly suspicious for a cancerous process. Pulmonology, heme/onc consulted- would not be an ideal candidate for chemotherapy and radiation due to her very poor performance status. Rec palliative care- family now wishing to pursue facility with hospice. 11-11: Presented overnight from OSH ED where she was taken with shortness of breath. Large pleural effusion on left discovered and they were unable to perform thoracentesis in ED and thus was transferred for this and for pulmonology consult. Patient demented, unable to provide history. Admitted to med/surg on levofloxacin, piperacillin/tazobactam, and vancomycin. Underwent US-guided thoracentesis. Pulmonology consulted. 11-12: Still very confused but seems to be breathing more easily now. Will need to contact family to determine her baseline mental status. 11-13: Nauseated and coughing up small amounts of thick sputum. Feels worse today. Per family, she is sharp at her baseline. Tachycardic with long runs of SVT to 180s. Transferred to PCU with diltiazem bolus and drip ordered. Cardiology consulted. Guaifenesin/dextromethorphan to thin secretions. 11-14: Oral diltiazem, wean from drip. Levofloxacin and vancomycin stopped by pulmonology. Improving, possibly home soon. 11-15: Significantly increased left pleural effusion, repeat thoracentesis done with 900 mL removed. More short of breath and lethargic today.  Discussed with pulmonology, will be started on steroids. Possibility that she will need VATS. 11-16: No significant change in radiographic appearance. Pulmonology is ordering CT, may require CT surgery consult, though she is a DNR and a poor surgical candidate so unsure what their surgical offerings would be.  11-17: CT yesterday strongly suggestive of cancer. I informed the patient but she was not able to give me any indication at this time whether she wishes to pursue further investigation or treatment. Have consulted palliative care as she had previously seemed to express interest in not pursing aggressive measures. Coughing up a large amount of yellowish white sputum. 11-18: family opting to pursue facility w/ hospice. ROS: Six point review of systems is negative except as specifically addressed above.     DIET DYSPHAGIA SOFT AND BITE-SIZED; Mildly Thick (Nectar)    Intake/Output Summary (Last 24 hours) at 11/18/2020 1319  Last data filed at 11/18/2020 0936  Gross per 24 hour   Intake 270 ml   Output --   Net 270 ml     Medications:   sodium chloride 75 mL/hr (11/13/20 1154)    dextrose       Current Facility-Administered Medications   Medication Dose Route Frequency Provider Last Rate Last Dose    dilTIAZem (CARDIZEM) tablet 120 mg  120 mg Oral 2 times per day Cira Leach MD   120 mg at 11/17/20 2150    potassium chloride (KLOR-CON M) extended release tablet 40 mEq  40 mEq Oral PRN Philippe Ryder MD        Or    potassium bicarb-citric acid (EFFER-K) effervescent tablet 40 mEq  40 mEq Oral PRN Philippe Ryder MD   40 mEq at 11/15/20 0615    Or    potassium chloride 10 mEq/100 mL IVPB (Peripheral Line)  10 mEq Intravenous PRN Philippe Ryder  mL/hr at 11/16/20 1827 10 mEq at 11/16/20 1827    magnesium sulfate 1 g in dextrose 5% 100 mL IVPB  1 g Intravenous PRN Philippe Ryder MD   Stopped at 11/16/20 0643    insulin glargine (LANTUS) injection vial 10 Units  10 Units Subcutaneous Daily Joaquín Pederson DO   10 Units at 11/18/20 6218    piperacillin-tazobactam (ZOSYN) 3.375 g in dextrose 5 % 50 mL IVPB extended infusion (mini-bag)  3.375 g Intravenous Q8H Pedro Luis Navarrete MD   Stopped at 11/18/20 1237    guaiFENesin-dextromethorphan (ROBITUSSIN DM) 100-10 MG/5ML syrup 5 mL  5 mL Oral Q4H PRN Tu Hahn DO        0.9 % sodium chloride infusion   Intravenous Continuous Pedro Luis Navarrete MD 75 mL/hr at 11/13/20 1154 75 mL/hr at 11/13/20 1154    sodium chloride flush 0.9 % injection 10 mL  10 mL Intravenous 2 times per day Pedro Luis Navarrete MD   10 mL at 11/18/20 5427    sodium chloride flush 0.9 % injection 10 mL  10 mL Intravenous PRN Pedro Luis Navarrete MD        acetaminophen (TYLENOL) tablet 650 mg  650 mg Oral Q6H PRN Pedro Luis Navarrete MD   650 mg at 11/11/20 1948    Or    acetaminophen (TYLENOL) suppository 650 mg  650 mg Rectal Q6H PRN Pedro Luis Navarrete MD        polyethylene glycol (GLYCOLAX) packet 17 g  17 g Oral Daily PRN Pedro Luis Navarrete MD        promethazine (PHENERGAN) tablet 12.5 mg  12.5 mg Oral Q6H PRN Pedro Luis Navarrete MD        Or    ondansetron [facility]) injection 4 mg  4 mg Intravenous Q6H PRN Pedro Luis Navarrete MD   4 mg at 11/12/20 1053    ipratropium-albuterol (DUONEB) nebulizer solution 1 ampule  1 ampule Inhalation Q4H WA Pedro Luis Navarrete MD   1 ampule at 11/18/20 1043    insulin lispro (HUMALOG) injection vial 0-6 Units  0-6 Units Subcutaneous TID WC Pedro Luis Navarrete MD   1 Units at 11/18/20 0838    insulin lispro (HUMALOG) injection vial 0-3 Units  0-3 Units Subcutaneous Nightly Pedro Luis Navarrete MD   1 Units at 11/16/20 2045    glucose (GLUTOSE) 40 % oral gel 15 g  15 g Oral PRN Pedro Luis Navarrete MD        dextrose 50 % IV solution  12.5 g Intravenous PRN Pedro Luis Navarrete MD        glucagon (rDNA) injection 1 mg  1 mg Intramuscular PRN Pedro Luis Navarrete MD        dextrose 5 % solution  100 mL/hr Intravenous

## 2020-11-18 NOTE — PROGRESS NOTES
This pt lives outside of [facility] services area. Spoke to [employer].  will notify the SNF re the hospice consult so they can follow up with a Hospice in their area.

## 2020-11-18 NOTE — PROGRESS NOTES
Palliative Care Progress Note  11/18/2020 10:33 AM  Subjective:   Admit Date: 11/11/2020  PCP: Lexa Estrada    Chief Complaint: Tired    Interval History: No acute overnight events, patient resting comfortably in bed, sats 93% on room air. She does remain weak, sleeps frequently, PO intake remains poor. Patient's granddaughter considering hospice care, SW assisting with placement efforts. Portions of this note have been copied forward, however, changed to reflect the most current clinical status of this patient.     Review of Systems   Difficult to obtain due to acuity of care, patient does endorse fatigue, weakness, and hearing loss    DIET DYSPHAGIA SOFT AND BITE-SIZED; Mildly Thick (Nectar)    Medications:   dilTIAZem (CARDIZEM) 125 mg in dextrose 5% 125 mL infusion Stopped (11/17/20 1317)    sodium chloride 75 mL/hr (11/13/20 1154)    dextrose       Current Facility-Administered Medications   Medication Dose Route Frequency Provider Last Rate Last Dose    dilTIAZem (CARDIZEM) tablet 120 mg  120 mg Oral 2 times per day Frannie Almanza MD   120 mg at 11/17/20 2150    potassium chloride (KLOR-CON M) extended release tablet 40 mEq  40 mEq Oral PRN Brando Canchola MD        Or    potassium bicarb-citric acid (EFFER-K) effervescent tablet 40 mEq  40 mEq Oral PRN Brando Canchola MD   40 mEq at 11/15/20 0615    Or    potassium chloride 10 mEq/100 mL IVPB (Peripheral Line)  10 mEq Intravenous PRN Brando Canchola  mL/hr at 11/16/20 1827 10 mEq at 11/16/20 1827    magnesium sulfate 1 g in dextrose 5% 100 mL IVPB  1 g Intravenous PRN Brando Canchola MD   Stopped at 11/16/20 0643    insulin glargine (LANTUS) injection vial 10 Units  10 Units Subcutaneous Daily Davidson Thorpe, DO   10 Units at 11/18/20 0837    piperacillin-tazobactam (ZOSYN) 3.375 g in dextrose 5 % 50 mL IVPB extended infusion (mini-bag)  3.375 g Intravenous Q8H Adam Elizabeth MD 12.5 mL/hr at 11/18/20 0837 3.375 g at 11/18/20 1214 Labs     11/16/20 0157 11/17/20 0132 11/18/20 0225   WBC 13.7* 12.8* 13.3*   RBC 3.10* 3.32* 3.37*   HGB 8.9* 9.4* 9.5*   HCT 27.4* 29.9* 29.9*   MCV 88.4 90.1 88.7   MCH 28.7 28.3 28.2   MCHC 32.5* 31.4* 31.8*    345 320     Recent Labs     11/16/20 0157 11/17/20 0132 11/18/20 0225    137 137   K 3.0* 3.6 3.6   ANIONGAP 8 10 11    101 99   CO2 28 26 27   BUN 16 15 14   CREATININE 0.7 0.7 0.6   GLUCOSE 121* 119* 159*   CALCIUM 9.3 8.8 8.8     Recent Labs     11/16/20 0157   MG 1.4*     No results for input(s): AST, ALT, ALB, BILITOT, ALKPHOS, ALB in the last 72 hours. ABGs:No results for input(s): PHART, HWS5LFI, PO2ART, PFK3HXG, BEART, HGBAE, I7JIVMHD, CARBOXHGBART, 02THERAPY in the last 72 hours. HgBA1c: No results for input(s): LABA1C in the last 72 hours. FLP:  No results found for: TRIG, HDL, LDLCALC, LDLDIRECT, LABVLDL  TSH:    Lab Results   Component Value Date    TSH 1.700 11/13/2020     Troponin T: No results for input(s): TROPONINI in the last 72 hours. INR: No results for input(s): INR in the last 72 hours.     Objective:   Vitals: /70   Pulse 79   Temp 98.2 °F (36.8 °C) (Temporal)   Resp 16   Ht 5' 7\" (1.702 m)   Wt 123 lb (55.8 kg)   SpO2 91%   BMI 19.26 kg/m²   24HR INTAKE/OUTPUT:      Intake/Output Summary (Last 24 hours) at 11/18/2020 1033  Last data filed at 11/18/2020 0936  Gross per 24 hour   Intake 407.5 ml   Output --   Net 407.5 ml     Physical Exam  General appearance: alert, frail, appears ill, no acute distress  HEENT: atraumatic, eyes with clear conjunctiva and normal lids, pupils and irises normal, external ears and nose are normal,lips normal.  Neck: without masses, supple   Lungs: no increased work of breathing, diminished bilaterally L>R  Heart: regular rate and rhythm and S1, S2 normal  Abdomen: soft, non-tender; bowel sounds normal; no masses,  no organomegaly  Genitourinary: No bladder fullness, masses, or tenderness  Extremities: atraumatic, trace pedal edema bilaterally  Neurologic: No focal neurologic deficits, normal sensation, alert to self, situation, able to follow commands  Psychiatric: no agitation or anxiety, flat affect  Skin: warm, dry        Assessment and Plan:   Principal Problem:    Sepsis ([facility] [address]  Active Problems:    Pneumonia    Palliative care patient    Pleural effusion    Hyponatremia    Normocytic anemia    Type 2 diabetes mellitus ([facility] [address]    History of stroke with residual deficit    Dementia (HCC)    Hypocalcemia    Essential hypertension    Supraventricular tachycardia ([facility] [address]    Acute respiratory failure with hypoxia (HCC)    Fatigue  Resolved Problems:    * No resolved hospital problems. *      Visit Summary: I saw the patient at the bedside with no family present. She is resting comfortably in bed, will shake her her to answer questions, and pushed my hand away when I touched her arm. RT also reports that the patient told her to [facility] her alone\" earlier this morning. Patient is currently comfortable and does not have symptom need managements that would qualify her for inpatient hospice care. I contacted the patient's granddaughter, Lacey Castañeda, to further discuss goals of care and provided an update on the patient's status. Chet Bailey does want to pursue hospice for the patient (order placed).  has made referral to one facility and Joemartha Bailey has given me info on alternate facility for referral, which I have passed to . All questions were answered and emotional support provided to patient's granddaughter. Palliative will follow as needed. Recommendations:   1. Family would like to pursue hospice care. Patient will need facility placement. If patient were to have decline or become symptomatic, she can be evaluated for inpatient hospice at anytime. Thank you for consulting Palliative Care and allowing us to participate in the care of this patient.        Electronically signed by SAGE Banks on 11/18/2020 at 10:33 AM    (Please note that portions of this note were completed with a voice recognition program.  Effortswere made to edit the dictations but occasionally words are mis-transcribed.)

## 2020-11-18 NOTE — PROGRESS NOTES
Nutrition Assessment     Type and Reason for Visit: RD Nutrition Re-Screen/LOS    Nutrition Assessment:  Pt is nutritionally compromised AEB poor intake since admission. Discharge planning with goal of hospice/comfort care. Will continue to follow per protocol. Malnutrition Assessment:  Malnutrition Status: Insufficient data    Estimated Daily Nutrient Needs:  Energy (kcal): 8193-4327(70-06 kcal/kg); Weight Used for Energy Requirements:  Admission     Protein (g): (1.5-2 g/kg); Weight Used for Protein Requirements:  Admission        Fluid (ml/day): 3134-4084;  Weight Used for Fluid Requirements:  1 ml/kcal      Nutrition Related Findings:        Current Nutrition Therapies:    DIET DYSPHAGIA SOFT AND BITE-SIZED; Mildly Thick (Nectar)    Anthropometric Measures:  · Height: 5' 7\" (170.2 cm)  · Current Body Wt: 123 lb (55.8 kg)   · BMI: 19.3    Nutrition Diagnosis:   · Inadequate oral intake related to early satiety as evidenced by intake 0-25%      Nutrition Interventions:   Food and/or Nutrient Delivery:  Continue Current Diet  Nutrition Education/Counseling:  No recommendation at this time   Coordination of Nutrition Care:  No recommendation at this time    Goals:  Comfort care       Nutrition Monitoring and Evaluation:   Behavioral-Environmental Outcomes:  None Identified   Food/Nutrient Intake Outcomes:  None Identified  Physical Signs/Symptoms Outcomes:  None Identified     Discharge Planning:    No discharge needs at this time     Electronically signed by Mario Dubon MS, RD, LD on 11/18/20 at 2:51 PM CST    Contact: [phone]

## 2020-11-18 NOTE — PROGRESS NOTES
Pulmonary and Critical Care Progress Note 400 [facility]    Patient: Ekaterina Flores  2/17/1931   MR# [id number]   Acct# [de-identified]  11/18/20   8:37 AM  Referring Provider: Yovani Giordano MD    Chief Complaint: Pleural effusion    Interval history: Patient resting this morning. Breathing comfortably on room air. Notes reviewed. Consideration for transition to inpatient hospice per family wishes. Awaiting final decision. No new issues. Medications:   dilTIAZem, 120 mg, Oral, 2 times per day    insulin glargine, 10 Units, Subcutaneous, Daily    piperacillin-tazobactam, 3.375 g, Intravenous, Q8H **AND** [DISCONTINUED] levofloxacin, 750 mg, Intravenous, Q48H    sodium chloride flush, 10 mL, Intravenous, 2 times per day    ipratropium-albuterol, 1 ampule, Inhalation, Q4H WA    insulin lispro, 0-6 Units, Subcutaneous, TID WC    insulin lispro, 0-3 Units, Subcutaneous, Nightly   Review of Systems: Review of Systems   Constitutional: Positive for fatigue. HENT: Positive for hearing loss. Respiratory: Positive for cough, choking and shortness of breath. Neurological: Positive for weakness. Physical Exam:  Blood pressure 138/70, pulse 79, temperature 98.2 °F (36.8 °C), temperature source Temporal, resp. rate 16, height 5' 7\" (1.702 m), weight 123 lb (55.8 kg), SpO2 91 %. Intake/Output Summary (Last 24 hours) at 11/18/2020 0837  Last data filed at 11/18/2020 0304  Gross per 24 hour   Intake 527.5 ml   Output --   Net 527.5 ml     Physical Exam:    GENERAL APPEARANCE: Hard of hearing elderly lady, sickly looking, but in no acute distress  HEENT: Eyes closed, external ears and nose normal, mucous membrane dry, nasal cannula  PULMONARY: Normal chest on inspection, surprisingly still have some breath sounds on the left side yet diminished compared to the right. No crackles  CARDIAC: Rate controlled, irregular, normal S1-S2, no gallop, positive soft murmur .   ABDOMEN: Soft, nontender, positive bowel sounds, no hepatospleno megaly, no rebound, no distention  SKIN: Warm and dry, no rashes or skin lesion, no skin palpable nodules, good turgor pressure  EXTREMITIES: palpable pulses distally with no cyanosis or clubbing, no lower extremities edema  NEURO: Sleeping    Recent Labs     11/16/20  0157 11/17/20 0132 11/18/20 0225   WBC 13.7* 12.8* 13.3*   RBC 3.10* 3.32* 3.37*   HGB 8.9* 9.4* 9.5*   HCT 27.4* 29.9* 29.9*    345 320   MCV 88.4 90.1 88.7   MCH 28.7 28.3 28.2   MCHC 32.5* 31.4* 31.8*   RDW 13.7 13.8 13.7      Recent Labs     11/15/20  0944 11/16/20 0157 11/17/20 0132 11/18/20 0225   NA  --  138 137 137   K  --  3.0* 3.6 3.6   CL  --  102 101 99   CO2  --  28 26 27   BUN  --  16 15 14   CREATININE  --  0.7 0.7 0.6   CALCIUM  --  9.3 8.8 8.8   GLUCOSE  --  121* 119* 159*   MG  --  1.4*  --   --    INR 1.50*  --   --   --       Recent Labs     11/15/20  1000   LABGRAM Moderate WBC's (Polymorphonuclear)  No organisms seen       Radiograph:   None today  My radiograph interpretation: None today, 11-18-20     Pulmonary Assessment:    1. Acute hypoxic respiratory failure, stable to improved  2. Large left-sided pleural effusion  3. Status post ultrasound-guided thoracentesis  4. Possible heart failure versus parapneumonic effusion  5. Community-acquired pneumonia  6. Hypertension  7. Hyperlipidemia  8. Diabetes mellitus  9. History of stroke  10. Dysphagia    Recommend:     · CT showing pleural effusion, atelectasis, adenopathy and pleura disease that is highly suspicious for a cancerous process. · She would not be an ideal candidate for chemotherapy and radiation due to her current functional status.     · Family considering transition to inpatient hospice which is reasonable   · Oncology note reviewed   · Continue current therapies in the interim     Electronically signed by Desi Burciaga on 11/18/2020 at 8:37 AM    Physician Substantive portion:  Patient was seen in the follow-up visit in pulmonary rounds in medical floor today with Darlene Carmen. Kenna Durant. I concur with her clinical documentation and I also examined the patient. Patient is elderly lady with recurrent left-sided malignant pleural effusion acute respiratory failure. As expected she is not doing very well currently resting comfortably on supplemental oxygen. 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 Oncology did not recommend any aggressive chemotherapy due to her advanced age and underlying dementia and other medical problems and she is most likely going for comfort measures or hospice care today and family is in the process of talking to the palliative care team.    As we do not have any other new recommendations at this moment I will sign off from the patient's care and would be happy to see the patient in the future if requested again. We appreciate the consult from the hospitalist team and like to thank them for the referral.    I have seen and examined patient personally, performing a face-to-face diagnostic evaluation with plan of care reviewed and developed with APRN and nursing staff. I have addended and/or modified the above history of present illness, physical examination, and assessment and plan to reflect my findings and impressions. Essential elements of the care plan were discussed with APRN above. Agree with findings and assessment/plan as documented above.     Electronically signed by   Asif Manuel MD   on 11/18/2020, 10:17 AM

## 2020-11-18 NOTE — PLAN OF CARE
Problem: Falls - Risk of:  Goal: Will remain free from falls  Description: Will remain free from falls  Outcome: Met This Shift  Goal: Absence of physical injury  Description: Absence of physical injury  Outcome: Met This Shift     Problem: Safety:  Goal: Free from accidental physical injury  Description: Free from accidental physical injury  Outcome: Met This Shift  Goal: Free from intentional harm  Description: Free from intentional harm  Outcome: Met This Shift     Problem: Daily Care:  Goal: Daily care needs are met  Description: Daily care needs are met  Outcome: Met This Shift     Problem: Injury - Risk of, Physical Injury:  Goal: Will remain free from falls  Description: Will remain free from falls  Outcome: Met This Shift  Goal: Absence of physical injury  Description: Absence of physical injury  Outcome: Met This Shift     Problem: Skin Integrity:  Goal: Will show no infection signs and symptoms  Description: Will show no infection signs and symptoms  Outcome: Ongoing  Goal: Absence of new skin breakdown  Description: Absence of new skin breakdown  Outcome: Ongoing     Problem: Infection:  Goal: Will remain free from infection  Description: Will remain free from infection  Outcome: Ongoing     Problem: Pain:  Goal: Patient's pain/discomfort is manageable  Description: Patient's pain/discomfort is manageable  Outcome: Ongoing  Goal: Pain level will decrease  Description: Pain level will decrease  Outcome: Ongoing  Goal: Control of acute pain  Description: Control of acute pain  Outcome: Ongoing  Goal: Control of chronic pain  Description: Control of chronic pain  Outcome: Ongoing     Problem: Skin Integrity:  Goal: Skin integrity will stabilize  Description: Skin integrity will stabilize  Outcome: Ongoing     Problem: Discharge Planning:  Goal: Patients continuum of care needs are met  Description: Patients continuum of care needs are met  Outcome: Ongoing     Problem: Confusion - Acute:  Goal: Absence of continued neurological deterioration signs and symptoms  Description: Absence of continued neurological deterioration signs and symptoms  Outcome: Ongoing  Goal: Mental status will be restored to baseline  Description: Mental status will be restored to baseline  Outcome: Ongoing     Problem: Discharge Planning:  Goal: Ability to perform activities of daily living will improve  Description: Ability to perform activities of daily living will improve  Outcome: Ongoing  Goal: Participates in care planning  Description: Participates in care planning  Outcome: Ongoing     Problem: Mood - Altered:  Goal: Mood stable  Description: Mood stable  Outcome: Ongoing  Goal: Absence of abusive behavior  Description: Absence of abusive behavior  Outcome: Ongoing  Goal: Verbalizations of feeling emotionally comfortable while being cared for will increase  Description: Verbalizations of feeling emotionally comfortable while being cared for will increase  Outcome: Ongoing     Problem: Psychomotor Activity - Altered:  Goal: Absence of psychomotor disturbance signs and symptoms  Description: Absence of psychomotor disturbance signs and symptoms  Outcome: Ongoing     Problem: Sensory Perception - Impaired:  Goal: Demonstrations of improved sensory functioning will increase  Description: Demonstrations of improved sensory functioning will increase  Outcome: Ongoing  Goal: Decrease in sensory misperception frequency  Description: Decrease in sensory misperception frequency  Outcome: Ongoing  Goal: Able to refrain from responding to false sensory perceptions  Description: Able to refrain from responding to false sensory perceptions  Outcome: Ongoing  Goal: Demonstrates accurate environmental perceptions  Description: Demonstrates accurate environmental perceptions  Outcome: Ongoing  Goal: Able to distinguish between reality-based and nonreality-based thinking  Description: Able to distinguish between reality-based and nonreality-based thinking  Outcome: Ongoing  Goal: Able to interrupt nonreality-based thinking  Description: Able to interrupt nonreality-based thinking  Outcome: Ongoing     Problem: Sleep Pattern Disturbance:  Goal: Appears well-rested  Description: Appears well-rested  Outcome: Ongoing

## 2020-11-18 NOTE — CARE COORDINATION
Informed by [facility] DON the facility is unable to accept any new referrals at this time. Contacted the granddtr Emilio Ayers and discussed SNFs in the [facility] area and the dtr requested a referral to [address] [place] [address]. SW contacted [address] [place] [address] to determine bed availability and hospice services for the pt. GINNA spoke with Sreekanth Swain at the facility and faxed the pt's referral for review.      [address] [place] [address]  Ph: [phone]  Fa: [phone]

## 2020-11-19 ENCOUNTER — OUTSIDE FACILITY SERVICE (OUTPATIENT)
Dept: PULMONOLOGY | Facility: CLINIC | Age: 85
End: 2020-11-19

## 2020-11-19 LAB
ANAEROBIC CULTURE: NORMAL
ANION GAP SERPL CALCULATED.3IONS-SCNC: 11 MMOL/L (ref 7–19)
BODY FLUID CULTURE, STERILE: NORMAL
BUN BLDV-MCNC: 15 MG/DL (ref 8–23)
CALCIUM SERPL-MCNC: 8.6 MG/DL (ref 8.8–10.2)
CHLORIDE BLD-SCNC: 99 MMOL/L (ref 98–111)
CO2: 28 MMOL/L (ref 22–29)
CREAT SERPL-MCNC: 0.6 MG/DL (ref 0.5–0.9)
EKG P AXIS: NORMAL DEGREES
EKG P AXIS: NORMAL DEGREES
EKG P-R INTERVAL: NORMAL MS
EKG P-R INTERVAL: NORMAL MS
EKG Q-T INTERVAL: 358 MS
EKG Q-T INTERVAL: 370 MS
EKG QRS DURATION: 102 MS
EKG QRS DURATION: 94 MS
EKG QTC CALCULATION (BAZETT): 403 MS
EKG QTC CALCULATION (BAZETT): 419 MS
EKG T AXIS: 135 DEGREES
EKG T AXIS: 94 DEGREES
GFR AFRICAN AMERICAN: >59
GFR NON-AFRICAN AMERICAN: >60
GLUCOSE BLD-MCNC: 115 MG/DL (ref 70–99)
GLUCOSE BLD-MCNC: 126 MG/DL (ref 70–99)
GLUCOSE BLD-MCNC: 72 MG/DL (ref 74–109)
GLUCOSE BLD-MCNC: 85 MG/DL (ref 70–99)
GLUCOSE BLD-MCNC: 87 MG/DL (ref 70–99)
GLUCOSE BLD-MCNC: 97 MG/DL (ref 70–99)
GRAM STAIN RESULT: NORMAL
HCT VFR BLD CALC: 29.5 % (ref 37–47)
HEMOGLOBIN: 9.4 G/DL (ref 12–16)
MAGNESIUM: 1.5 MG/DL (ref 1.6–2.4)
MCH RBC QN AUTO: 28.3 PG (ref 27–31)
MCHC RBC AUTO-ENTMCNC: 31.9 G/DL (ref 33–37)
MCV RBC AUTO: 88.9 FL (ref 81–99)
PDW BLD-RTO: 13.8 % (ref 11.5–14.5)
PERFORMED ON: ABNORMAL
PERFORMED ON: ABNORMAL
PERFORMED ON: NORMAL
PLATELET # BLD: 295 K/UL (ref 130–400)
PMV BLD AUTO: 9.6 FL (ref 9.4–12.3)
POTASSIUM REFLEX MAGNESIUM: 3.3 MMOL/L (ref 3.5–5)
RBC # BLD: 3.32 M/UL (ref 4.2–5.4)
SODIUM BLD-SCNC: 138 MMOL/L (ref 136–145)
WBC # BLD: 15.2 K/UL (ref 4.8–10.8)

## 2020-11-19 PROCEDURE — 6370000000 HC RX 637 (ALT 250 FOR IP): Performed by: FAMILY MEDICINE

## 2020-11-19 PROCEDURE — 6360000002 HC RX W HCPCS: Performed by: HOSPITALIST

## 2020-11-19 PROCEDURE — 80048 BASIC METABOLIC PNL TOTAL CA: CPT

## 2020-11-19 PROCEDURE — 36415 COLL VENOUS BLD VENIPUNCTURE: CPT

## 2020-11-19 PROCEDURE — 82947 ASSAY GLUCOSE BLOOD QUANT: CPT

## 2020-11-19 PROCEDURE — 93005 ELECTROCARDIOGRAM TRACING: CPT | Performed by: INTERNAL MEDICINE

## 2020-11-19 PROCEDURE — 92526 ORAL FUNCTION THERAPY: CPT

## 2020-11-19 PROCEDURE — 6370000000 HC RX 637 (ALT 250 FOR IP): Performed by: INTERNAL MEDICINE

## 2020-11-19 PROCEDURE — 99232 SBSQ HOSP IP/OBS MODERATE 35: CPT | Performed by: INTERNAL MEDICINE

## 2020-11-19 PROCEDURE — 94640 AIRWAY INHALATION TREATMENT: CPT

## 2020-11-19 PROCEDURE — 6370000000 HC RX 637 (ALT 250 FOR IP): Performed by: HOSPITALIST

## 2020-11-19 PROCEDURE — 2580000003 HC RX 258: Performed by: HOSPITALIST

## 2020-11-19 PROCEDURE — 2700000000 HC OXYGEN THERAPY PER DAY

## 2020-11-19 PROCEDURE — 1210000000 HC MED SURG R&B

## 2020-11-19 PROCEDURE — 83735 ASSAY OF MAGNESIUM: CPT

## 2020-11-19 PROCEDURE — 85027 COMPLETE CBC AUTOMATED: CPT

## 2020-11-19 RX ADMIN — IPRATROPIUM BROMIDE AND ALBUTEROL SULFATE 1 AMPULE: .5; 3 SOLUTION RESPIRATORY (INHALATION) at 14:29

## 2020-11-19 RX ADMIN — SODIUM CHLORIDE: 9 INJECTION, SOLUTION INTRAVENOUS at 20:37

## 2020-11-19 RX ADMIN — DILTIAZEM HYDROCHLORIDE 120 MG: 30 TABLET, FILM COATED ORAL at 08:20

## 2020-11-19 RX ADMIN — PIPERACILLIN AND TAZOBACTAM 3.38 G: 3; .375 INJECTION, POWDER, LYOPHILIZED, FOR SOLUTION INTRAVENOUS at 00:35

## 2020-11-19 RX ADMIN — PIPERACILLIN AND TAZOBACTAM 3.38 G: 3; .375 INJECTION, POWDER, LYOPHILIZED, FOR SOLUTION INTRAVENOUS at 08:20

## 2020-11-19 RX ADMIN — PIPERACILLIN AND TAZOBACTAM 3.38 G: 3; .375 INJECTION, POWDER, LYOPHILIZED, FOR SOLUTION INTRAVENOUS at 16:07

## 2020-11-19 RX ADMIN — IPRATROPIUM BROMIDE AND ALBUTEROL SULFATE 1 AMPULE: .5; 3 SOLUTION RESPIRATORY (INHALATION) at 07:52

## 2020-11-19 RX ADMIN — Medication 10 ML: at 20:38

## 2020-11-19 RX ADMIN — MAGNESIUM SULFATE HEPTAHYDRATE 1 G: 1 INJECTION, SOLUTION INTRAVENOUS at 06:14

## 2020-11-19 RX ADMIN — IPRATROPIUM BROMIDE AND ALBUTEROL SULFATE 1 AMPULE: .5; 3 SOLUTION RESPIRATORY (INHALATION) at 10:56

## 2020-11-19 RX ADMIN — POTASSIUM CHLORIDE 40 MEQ: 1500 TABLET, EXTENDED RELEASE ORAL at 08:20

## 2020-11-19 RX ADMIN — Medication 10 UNITS: at 08:20

## 2020-11-19 RX ADMIN — Medication 10 ML: at 08:39

## 2020-11-19 RX ADMIN — MAGNESIUM SULFATE HEPTAHYDRATE 1 G: 1 INJECTION, SOLUTION INTRAVENOUS at 04:46

## 2020-11-19 RX ADMIN — IPRATROPIUM BROMIDE AND ALBUTEROL SULFATE 1 AMPULE: .5; 3 SOLUTION RESPIRATORY (INHALATION) at 20:09

## 2020-11-19 RX ADMIN — DILTIAZEM HYDROCHLORIDE 120 MG: 30 TABLET, FILM COATED ORAL at 20:33

## 2020-11-19 ASSESSMENT — ENCOUNTER SYMPTOMS
COUGH: 1
SHORTNESS OF BREATH: 1
CHOKING: 1

## 2020-11-19 ASSESSMENT — PAIN SCALES - GENERAL: PAINLEVEL_OUTOF10: 0

## 2020-11-19 NOTE — PROGRESS NOTES
thoracentesis done with 900 mL removed. More short of breath and lethargic today. Discussed with pulmonology, will be started on steroids. Possibility that she will need VATS. 11-16: No significant change in radiographic appearance. Pulmonology is ordering CT, may require CT surgery consult, though she is a DNR and a poor surgical candidate so unsure what their surgical offerings would be.  11-17: CT yesterday strongly suggestive of cancer. I informed the patient but she was not able to give me any indication at this time whether she wishes to pursue further investigation or treatment. Have consulted palliative care as she had previously seemed to express interest in not pursing aggressive measures. Coughing up a large amount of yellowish white sputum. 11-18: family opting to pursue facility w/ hospice. ROS: Six point review of systems is negative except as specifically addressed above.     Diet NPO Effective Now    Intake/Output Summary (Last 24 hours) at 11/19/2020 0915  Last data filed at 11/19/2020 7312  Gross per 24 hour   Intake 515 ml   Output 425 ml   Net 90 ml     Medications:   sodium chloride 75 mL/hr (11/13/20 1154)    dextrose       Current Facility-Administered Medications   Medication Dose Route Frequency Provider Last Rate Last Dose    dilTIAZem (CARDIZEM) tablet 120 mg  120 mg Oral 2 times per day Cira Leach MD   120 mg at 11/19/20 0820    potassium chloride (KLOR-CON M) extended release tablet 40 mEq  40 mEq Oral PRN Philippe Ryder MD   40 mEq at 11/19/20 0820    Or    potassium bicarb-citric acid (EFFER-K) effervescent tablet 40 mEq  40 mEq Oral PRN Philippe Ryder MD   40 mEq at 11/15/20 0615    Or    potassium chloride 10 mEq/100 mL IVPB (Peripheral Line)  10 mEq Intravenous PRN Philippe Ryder  mL/hr at 11/16/20 1827 10 mEq at 11/16/20 1827    magnesium sulfate 1 g in dextrose 5% 100 mL IVPB  1 g Intravenous PRN Philippe Ryder MD   Stopped at 11/19/20 0714    insulin glargine (LANTUS) injection vial 10 Units  10 Units Subcutaneous Daily Davidson Thorpe, DO   10 Units at 11/19/20 0820    piperacillin-tazobactam (ZOSYN) 3.375 g in dextrose 5 % 50 mL IVPB extended infusion (mini-bag)  3.375 g Intravenous Q8H Pedro Luis Navarrete MD 12.5 mL/hr at 11/19/20 0820 3.375 g at 11/19/20 0820    guaiFENesin-dextromethorphan (ROBITUSSIN DM) 100-10 MG/5ML syrup 5 mL  5 mL Oral Q4H PRN Davidson Thorpe, DO        0.9 % sodium chloride infusion   Intravenous Continuous Pedro Luis Navarrete MD 75 mL/hr at 11/13/20 1154 75 mL/hr at 11/13/20 1154    sodium chloride flush 0.9 % injection 10 mL  10 mL Intravenous 2 times per day Pedro Luis Navarrete MD   10 mL at 11/19/20 0839    sodium chloride flush 0.9 % injection 10 mL  10 mL Intravenous PRN Pedro Luis Navarrete MD        acetaminophen (TYLENOL) tablet 650 mg  650 mg Oral Q6H PRN Pedro Luis Navarrete MD   650 mg at 11/11/20 1948    Or    acetaminophen (TYLENOL) suppository 650 mg  650 mg Rectal Q6H PRN Pedro Luis Navarrete MD        polyethylene glycol (GLYCOLAX) packet 17 g  17 g Oral Daily PRN Pedro Luis Navarrete MD        promethazine (PHENERGAN) tablet 12.5 mg  12.5 mg Oral Q6H PRN Pedro Luis Navarrete MD        Or    [facility]etron [facility]) injection 4 mg  4 mg Intravenous Q6H PRN Pedro Luis Navarrete MD   4 mg at 11/12/20 1053    ipratropium-albuterol (DUONEB) nebulizer solution 1 ampule  1 ampule Inhalation Q4H WA Pedro Luis Navarrete MD   1 ampule at 11/19/20 0752    insulin lispro (HUMALOG) injection vial 0-6 Units  0-6 Units Subcutaneous TID WC Pedro Luis Navarrete MD   1 Units at 11/18/20 0838    insulin lispro (HUMALOG) injection vial 0-3 Units  0-3 Units Subcutaneous Nightly Pedro Luis Navarrete MD   1 Units at 11/16/20 2045    glucose (GLUTOSE) 40 % oral gel 15 g  15 g Oral PRN Pedro Luis Navarrete MD        dextrose 50 % IV solution  12.5 g Intravenous PRN MD Estephania Pedraza glucagon (rDNA) injection 1 mg  1 mg Intramuscular PRN Marco A Hodges MD        dextrose 5 % solution  100 mL/hr Intravenous PRN Marco A Hodges MD            Labs:     Recent Labs     11/17/20 0132 11/18/20 0225 11/19/20 0206   WBC 12.8* 13.3* 15.2*   RBC 3.32* 3.37* 3.32*   HGB 9.4* 9.5* 9.4*   HCT 29.9* 29.9* 29.5*   MCV 90.1 88.7 88.9   MCH 28.3 28.2 28.3   MCHC 31.4* 31.8* 31.9*    320 295     Recent Labs     11/17/20 0132 11/18/20 0225 11/19/20 0206    137 138   K 3.6 3.6 3.3*   ANIONGAP 10 11 11    99 99   CO2 26 27 28   BUN 15 14 15   CREATININE 0.7 0.6 0.6   GLUCOSE 119* 159* 72*   CALCIUM 8.8 8.8 8.6*     Recent Labs     11/19/20 0206   MG 1.5*     No results for input(s): AST, ALT, ALB, BILITOT, ALKPHOS, ALB in the last 72 hours. ABGs:No results for input(s): PH, PO2, PCO2, HCO3, BE, O2SAT in the last 72 hours. Troponin T: No results for input(s): TROPONINI in the last 72 hours. INR: No results for input(s): INR in the last 72 hours. Lactic Acid: No results for input(s): LACTA in the last 72 hours.     Objective:   Vitals: BP (!) 155/69   Pulse 75   Temp 97.4 °F (36.3 °C) (Temporal)   Resp 16   Ht 5' 7\" (1.702 m)   Wt 128 lb 8 oz (58.3 kg)   SpO2 93%   BMI 20.13 kg/m²   24HR INTAKE/OUTPUT:      Intake/Output Summary (Last 24 hours) at 11/19/2020 0915  Last data filed at 11/19/2020 0423  Gross per 24 hour   Intake 515 ml   Output 425 ml   Net 90 ml     General appearance: alert and cooperative with exam  Head: NC/AT   Eyes: normal sclera  Mouth: MMM   Lungs: no increased work of breathing  Heart: regular rate and rhythm, S1, S2 normal, no murmur, click, rub or gallop  Abdomen: soft, non-tender; bowel sounds normal; no masses,  no organomegaly  Extremities: extremities normal, atraumatic, no cyanosis or edema  Neurologic: not awake or alert  Skin: no rashes, nodules    Assessment and Plan:   Principal Problem:    Sepsis ([address]  Active Problems:

## 2020-11-19 NOTE — PROGRESS NOTES
Pulmonary and Critical Care Progress Note 400 [facility]    Patient: Sonja Saliva  2/17/1931   MR# [id number]   Acct# [de-identified]  11/19/20   7:35 AM  Referring Provider: Rebecca Whitman MD    Chief Complaint: Pleural effusion    Interval history: Patient resting this morning. Breathing comfortably on nasal cannula. She is receiving magnesium infusion. Patient not candidate for inpatient hospice unit here due to where she resides on the outpatient side. Referral made to skilled nursing facilities in her geographic area that well except hospice referrals. Awaiting decision. No family available. Medications:   dilTIAZem, 120 mg, Oral, 2 times per day    insulin glargine, 10 Units, Subcutaneous, Daily    piperacillin-tazobactam, 3.375 g, Intravenous, Q8H **AND** [DISCONTINUED] levofloxacin, 750 mg, Intravenous, Q48H    sodium chloride flush, 10 mL, Intravenous, 2 times per day    ipratropium-albuterol, 1 ampule, Inhalation, Q4H WA    insulin lispro, 0-6 Units, Subcutaneous, TID WC    insulin lispro, 0-3 Units, Subcutaneous, Nightly   Review of Systems: Review of Systems   Constitutional: Positive for fatigue. HENT: Positive for hearing loss. Respiratory: Positive for cough, choking and shortness of breath. Neurological: Positive for weakness. Physical Exam:  Blood pressure (!) 155/69, pulse 75, temperature 97.4 °F (36.3 °C), temperature source Temporal, resp. rate 16, height 5' 7\" (1.702 m), weight 128 lb 8 oz (58.3 kg), SpO2 92 %.     Intake/Output Summary (Last 24 hours) at 11/19/2020 9542  Last data filed at 11/19/2020 0508  Gross per 24 hour   Intake 515 ml   Output 425 ml   Net 90 ml     Physical Exam:    GENERAL APPEARANCE: Hard of hearing elderly lady, sickly looking, but in no acute distress  HEENT: Eyes closed, external ears and nose normal, mucous membrane dry, nasal cannula  PULMONARY: Normal chest on inspection, surprisingly still have some breath sounds on the left side yet diminished compared to the right. No crackles  CARDIAC: Rate controlled, irregular, normal S1-S2, no gallop, positive soft murmur . ABDOMEN: Soft, nontender, positive bowel sounds, no hepatospleno megaly, no rebound, no distention  SKIN: Warm and dry, no rashes or skin lesion, no skin palpable nodules, good turgor pressure  EXTREMITIES: palpable pulses distally with no cyanosis or clubbing, no lower extremities edema  NEURO: Sleeping    Recent Labs     11/17/20  0132 11/18/20 0225 11/19/20  0206   WBC 12.8* 13.3* 15.2*   RBC 3.32* 3.37* 3.32*   HGB 9.4* 9.5* 9.4*   HCT 29.9* 29.9* 29.5*    320 295   MCV 90.1 88.7 88.9   MCH 28.3 28.2 28.3   MCHC 31.4* 31.8* 31.9*   RDW 13.8 13.7 13.8      Recent Labs     11/17/20  0132 11/18/20 0225 11/19/20  0206    137 138   K 3.6 3.6 3.3*    99 99   CO2 26 27 28   BUN 15 14 15   CREATININE 0.7 0.6 0.6   CALCIUM 8.8 8.8 8.6*   GLUCOSE 119* 159* 72*   MG  --   --  1.5*      No results for input(s): BC, LABGRAM, CULTRESP, BFCX in the last 72 hours. Radiograph:   None today  My radiograph interpretation: None today, 11-19-20     Pulmonary Assessment:    1. Acute hypoxic respiratory failure, stable to improved  2. Large left-sided pleural effusion  3. Status post ultrasound-guided thoracentesis  4. Possible heart failure versus parapneumonic effusion  5. Community-acquired pneumonia  6. Hypertension  7. Hyperlipidemia  8. Diabetes mellitus  9. History of stroke  10. Dysphagia    Recommend:     · CT showing pleural effusion, atelectasis, adenopathy and pleura disease that is highly suspicious for a cancerous process. · She would not be an ideal candidate for chemotherapy and radiation due to her current functional status. · Family has requested transition to hospice. Referrals made to facilities that accept hospice.   Not a candidate for inpatient hospice at this facility    · Continue current therapies in the interim     Electronically signed by Hyacinth Poewll Isac Footeenedelia on 11/19/2020 at 7:35 AM    Physician Substantive portion:  She is hard of hearing, sleeping but arousable at my visit today. Breath sounds diminished padmini in bases. No distress. Plan noted for hospice. No additional plans. Will sign off, available if needed. I have seen and examined patient personally, performing a face-to-face diagnostic evaluation with plan of care reviewed and developed with APRN and nursing staff. I have addended and/or modified the above history of present illness, physical examination, and assessment and plan to reflect my findings and impressions. Essential elements of the care plan were discussed with APRN above. Agree with findings and assessment/plan as documented above.     Electronically signed by Shahab Lopez, on 11/19/2020, 9:08 PM

## 2020-11-19 NOTE — PLAN OF CARE
Problem: Falls - Risk of:  Goal: Will remain free from falls  Description: Will remain free from falls  Outcome: Ongoing  Goal: Absence of physical injury  Description: Absence of physical injury  Outcome: Ongoing     Problem: Skin Integrity:  Goal: Will show no infection signs and symptoms  Description: Will show no infection signs and symptoms  Outcome: Ongoing  Goal: Absence of new skin breakdown  Description: Absence of new skin breakdown  Outcome: Ongoing     Problem: Infection:  Goal: Will remain free from infection  Description: Will remain free from infection  Outcome: Ongoing     Problem: Safety:  Goal: Free from accidental physical injury  Description: Free from accidental physical injury  Outcome: Ongoing  Goal: Free from intentional harm  Description: Free from intentional harm  Outcome: Ongoing     Problem: Daily Care:  Goal: Daily care needs are met  Description: Daily care needs are met  Outcome: Ongoing     Problem: Pain:  Goal: Patient's pain/discomfort is manageable  Description: Patient's pain/discomfort is manageable  Outcome: Ongoing  Goal: Pain level will decrease  Description: Pain level will decrease  Outcome: Ongoing  Goal: Control of acute pain  Description: Control of acute pain  Outcome: Ongoing  Goal: Control of chronic pain  Description: Control of chronic pain  Outcome: Ongoing     Problem: Skin Integrity:  Goal: Skin integrity will stabilize  Description: Skin integrity will stabilize  Outcome: Ongoing     Problem: Discharge Planning:  Goal: Patients continuum of care needs are met  Description: Patients continuum of care needs are met  Outcome: Ongoing     Problem: Confusion - Acute:  Goal: Absence of continued neurological deterioration signs and symptoms  Description: Absence of continued neurological deterioration signs and symptoms  Outcome: Ongoing  Goal: Mental status will be restored to baseline  Description: Mental status will be restored to baseline  Outcome: Ongoing Problem: Discharge Planning:  Goal: Ability to perform activities of daily living will improve  Description: Ability to perform activities of daily living will improve  Outcome: Ongoing  Goal: Participates in care planning  Description: Participates in care planning  Outcome: Ongoing     Problem: Injury - Risk of, Physical Injury:  Goal: Will remain free from falls  Description: Will remain free from falls  Outcome: Ongoing  Goal: Absence of physical injury  Description: Absence of physical injury  Outcome: Ongoing     Problem: Mood - Altered:  Goal: Mood stable  Description: Mood stable  Outcome: Ongoing  Goal: Absence of abusive behavior  Description: Absence of abusive behavior  Outcome: Ongoing  Goal: Verbalizations of feeling emotionally comfortable while being cared for will increase  Description: Verbalizations of feeling emotionally comfortable while being cared for will increase  Outcome: Ongoing     Problem: Psychomotor Activity - Altered:  Goal: Absence of psychomotor disturbance signs and symptoms  Description: Absence of psychomotor disturbance signs and symptoms  Outcome: Ongoing     Problem: Sensory Perception - Impaired:  Goal: Demonstrations of improved sensory functioning will increase  Description: Demonstrations of improved sensory functioning will increase  Outcome: Ongoing  Goal: Decrease in sensory misperception frequency  Description: Decrease in sensory misperception frequency  Outcome: Ongoing  Goal: Able to refrain from responding to false sensory perceptions  Description: Able to refrain from responding to false sensory perceptions  Outcome: Ongoing  Goal: Demonstrates accurate environmental perceptions  Description: Demonstrates accurate environmental perceptions  Outcome: Ongoing  Goal: Able to distinguish between reality-based and nonreality-based thinking  Description: Able to distinguish between reality-based and nonreality-based thinking  Outcome: Ongoing  Goal: Able to interrupt

## 2020-11-19 NOTE — PROGRESS NOTES
Speech Language Pathology  Facility/Department: [facility] 3 SHELLY/VAS/MED  SWALLOW THERAPY     NAME: Renan Lucero  : 1931  MRN: [id number]    ADMISSION DATE: 2020  ADMITTING DIAGNOSIS: has Pneumonia; Palliative care patient; Pleural effusion; Sepsis ([address] Hyponatremia; Normocytic anemia; Type 2 diabetes mellitus ([facility] History of stroke with residual deficit; Dementia ([facility] [address] Hypocalcemia; Essential hypertension; Supraventricular tachycardia ([facility] [address] Acute respiratory failure with hypoxia ([facility] [address] and Fatigue on their problem list.    Date of Treat: 2020  Evaluating Therapist: Tiana Cr    Primary Complaint  RN notified SLP of delayed choking incident following medication administration this a.m. Reason for Referral  Renan Lucero was referred for a bedside swallow evaluation to assess the efficiency of her swallow function, identify signs and symptoms of aspiration and make recommendations regarding safe dietary consistencies, effective compensatory strategies, and safe eating environment. Impression  Re-assessed patient's swallowing function. Patient exhibits inconsistently slow oral transit and suspected swallow delay with even the slowest moving PO consistency possible as well as sluggish, mildly decreased laryngeal elevation for swallow airway protection. No outward S/S penetration/aspiration was noted with any puree trial or nectar thick liquid trial administered during treatment session this date. Patient did produce multiple swallows following each single presentation; question decreased base of tongue to posterior pharyngeal wall approximation during swallows with residue stacking in the throat and/or esophogeal dysfunction.     Per progress note documentation, patient family has decided upon hospice services. Would subsequently recommend puree consistency and nectar thick liquids for comfort feeds. TOTAL FEED.  Recommend meds crushed in pudding/applesauce.     Treatment Plan  Requires SLP Intervention: Yes     Recommended Diet and Intervention  Diet Solids Recommendation: Puree  Liquid Consistency Recommendation: Nectar thick   Recommended Form of Meds: Meds crushed in puree as able     Compensatory Swallowing Strategies  Compensatory Swallowing Strategies: Upright as possible for all oral intake;Small bites/sips;Eat/Feed slowly; Alternate solids and liquids; Remain upright for 30-45 minutes after meals     Treatment/Goals  Timeframe for Short-term Goals: 1x/day for 2 days   Goal 1: Patient will tolerate puree consistency and nectar thick liquids with min S/S penetration/aspiration during PO intake. Goal 2: Patient staff will follow swallow safety recommendations to decrease risk of penetration/aspiration during PO intake.      General  Chart Reviewed: Yes  Behavior/Cognition: Alert  O2 Device: Nasal Cannula   Communication Observation: (Weak voicing was noted during verbalizations.)  Follows Directions: Simple   Patient Positioning: Upright in bed  Consistencies Administered: Dysphagia Pureed (Dysphagia I); Nectar - spoon     Re-assessed patient's swallowing function with the following observations noted:     Oral Phase  Suspected Premature Bolus Loss: Puree;Nectar - spoon (Oral transit of puree consistency trials, presented by SLP, varied from 2-4 seconds in length.  Oral transit of nectar thick liquid trials, presented via spoon by SLP, varied from 2-3 seconds in length.)  Oral Phase - Comment: Min puree consistency residue was noted post swallows; residue cleared from the mouth with additional dry swallows.      Pharyngeal Phase  Suspected Swallow Delay: Puree;Nectar - spoon (Suspect secondary to oral transit times.)  Decreased Laryngeal Elevation: (Patient exhibited sluggish, mildly decreased laryngeal elevation for swallow airway protection.)  Pharyngeal Phase - Comment: No outward S/S penetration/aspiration was noted with any puree trial or nectar thick liquid trial administered during treatment session this date. Patient did produce multiple swallows following each single presentation; question decreased base of tongue to posterior pharyngeal wall approximation during swallows with residue stacking in the throat and/or esophogeal dysfunction.     Per progress note documentation, patient family has decided upon hospice services. Would subsequently recommend puree consistency and nectar thick liquids for comfort feeds. TOTAL FEED. Recommend meds crushed in pudding/applesauce.     Electronically signed by MALISSA Heath on 11/19/2020 at 12:17 PM

## 2020-11-19 NOTE — PROGRESS NOTES
Physician Progress Note      Nikolas Hood  [facility] #:                  [id number]  :                       1931  ADMIT DATE:       2020 6:07 AM  DISCH DATE:  RESPONDING  PROVIDER #:        CHRISTIAN PEDROZA          QUERY TEXT:    Pt admitted with Sepsis and Pneumonia with acute Respiratory Failure. If   possible, please document in the progress notes and discharge summary if you   are evaluating and/or treating any of the following: The medical record reflects the following:  Risk Factors: Septic, Advanced Age, Large Pleural Effusion  Clinical Indicators: CXR noted to be PNA with Large pleural effusion, SPO2 has   been in the low 90s, Blood Cultures + for Staphylococcus, Mycoplasma   Pneumoniae 0.13,  Treatment: Thoracentesis x 2, Vancomycin, Levaquin, Blood Cultures    Thank you in advance,    Mason Acosta RN-BSN  [id number]  [email or web address]  Options provided:  -- Gram negative pneumonia  -- Gram positive pneumonia  -- MRSA pneumonia  -- MSSA pneumonia  -- Bacterial pneumonia  -- Viral pneumonia  -- Aspiration pneumonia  -- Hypostatic pneumonia  -- Other - I will add my own diagnosis  -- Disagree - Not applicable / Not valid  -- Disagree - Clinically unable to determine / Unknown  -- Refer to Clinical Documentation Reviewer    PROVIDER RESPONSE TEXT:    This patient has bacterial pneumonia.     Query created by: Namrata Avila on 2020 5:24 PM      Electronically signed by:  Sergey Pacheco 2020 7:13 PM

## 2020-11-19 NOTE — CARE COORDINATION
Spoke with Mal at [facility] in [facility] who reports looks like able to accept and awaiting PASRR approval and bed availability, expecting possible admit to the facility tomorrow Friday 11/20/20. Also, pt will require covid results prior to admit to the facility. She requests to be documented in dc as \"skilled palliative\" to dc to the facility and have hospice services established following. [address] [place] [address]  Ph: [phone]  Fa: [address] to contact kalyan to arrange payor and complete paperwork.

## 2020-11-19 NOTE — PLAN OF CARE
Problem: Falls - Risk of:  Goal: Will remain free from falls  Description: Will remain free from falls  Outcome: Ongoing  Goal: Absence of physical injury  Description: Absence of physical injury  Outcome: Ongoing     Problem: Skin Integrity:  Goal: Will show no infection signs and symptoms  Description: Will show no infection signs and symptoms  Outcome: Ongoing  Goal: Absence of new skin breakdown  Description: Absence of new skin breakdown  Outcome: Ongoing     Problem: Infection:  Goal: Will remain free from infection  Description: Will remain free from infection  Outcome: Ongoing     Problem: Safety:  Goal: Free from accidental physical injury  Description: Free from accidental physical injury  Outcome: Ongoing  Goal: Free from intentional harm  Description: Free from intentional harm  Outcome: Ongoing     Problem: Daily Care:  Goal: Daily care needs are met  Description: Daily care needs are met  Outcome: Ongoing     Problem: Pain:  Goal: Patient's pain/discomfort is manageable  Description: Patient's pain/discomfort is manageable  Outcome: Ongoing  Goal: Pain level will decrease  Description: Pain level will decrease  Outcome: Ongoing  Goal: Control of acute pain  Description: Control of acute pain  Outcome: Ongoing  Goal: Control of chronic pain  Description: Control of chronic pain  Outcome: Ongoing     Problem: Skin Integrity:  Goal: Skin integrity will stabilize  Description: Skin integrity will stabilize  Outcome: Ongoing     Problem: Discharge Planning:  Goal: Patients continuum of care needs are met  Description: Patients continuum of care needs are met  Outcome: Ongoing     Problem: Confusion - Acute:  Goal: Absence of continued neurological deterioration signs and symptoms  Description: Absence of continued neurological deterioration signs and symptoms  Outcome: Ongoing  Goal: Mental status will be restored to baseline  Description: Mental status will be restored to baseline  Outcome: Ongoing Problem: Discharge Planning:  Goal: Ability to perform activities of daily living will improve  Description: Ability to perform activities of daily living will improve  Outcome: Ongoing  Goal: Participates in care planning  Description: Participates in care planning  Outcome: Ongoing     Problem: Injury - Risk of, Physical Injury:  Goal: Will remain free from falls  Description: Will remain free from falls  Outcome: Ongoing  Goal: Absence of physical injury  Description: Absence of physical injury  Outcome: Ongoing     Problem: Mood - Altered:  Goal: Mood stable  Description: Mood stable  Outcome: Ongoing  Goal: Absence of abusive behavior  Description: Absence of abusive behavior  Outcome: Ongoing  Goal: Verbalizations of feeling emotionally comfortable while being cared for will increase  Description: Verbalizations of feeling emotionally comfortable while being cared for will increase  Outcome: Ongoing     Problem: Psychomotor Activity - Altered:  Goal: Absence of psychomotor disturbance signs and symptoms  Description: Absence of psychomotor disturbance signs and symptoms  Outcome: Ongoing     Problem: Sensory Perception - Impaired:  Goal: Demonstrations of improved sensory functioning will increase  Description: Demonstrations of improved sensory functioning will increase  Outcome: Ongoing  Goal: Decrease in sensory misperception frequency  Description: Decrease in sensory misperception frequency  Outcome: Ongoing  Goal: Able to refrain from responding to false sensory perceptions  Description: Able to refrain from responding to false sensory perceptions  Outcome: Ongoing  Goal: Demonstrates accurate environmental perceptions  Description: Demonstrates accurate environmental perceptions  Outcome: Ongoing  Goal: Able to distinguish between reality-based and nonreality-based thinking  Description: Able to distinguish between reality-based and nonreality-based thinking  Outcome: Ongoing  Goal: Able to interrupt nonreality-based thinking  Description: Able to interrupt nonreality-based thinking  Outcome: Ongoing     Problem: Sleep Pattern Disturbance:  Goal: Appears well-rested  Description: Appears well-rested  Outcome: Ongoing     Problem: Falls - Risk of:  Goal: Will remain free from falls  Description: Will remain free from falls  Outcome: Ongoing  Goal: Absence of physical injury  Description: Absence of physical injury  Outcome: Ongoing     Problem: Skin Integrity:  Goal: Will show no infection signs and symptoms  Description: Will show no infection signs and symptoms  Outcome: Ongoing  Goal: Absence of new skin breakdown  Description: Absence of new skin breakdown  Outcome: Ongoing     Problem: Infection:  Goal: Will remain free from infection  Description: Will remain free from infection  Outcome: Ongoing     Problem: Safety:  Goal: Free from accidental physical injury  Description: Free from accidental physical injury  Outcome: Ongoing  Goal: Free from intentional harm  Description: Free from intentional harm  Outcome: Ongoing     Problem: Daily Care:  Goal: Daily care needs are met  Description: Daily care needs are met  Outcome: Ongoing     Problem: Pain:  Goal: Patient's pain/discomfort is manageable  Description: Patient's pain/discomfort is manageable  Outcome: Ongoing  Goal: Pain level will decrease  Description: Pain level will decrease  Outcome: Ongoing  Goal: Control of acute pain  Description: Control of acute pain  Outcome: Ongoing  Goal: Control of chronic pain  Description: Control of chronic pain  Outcome: Ongoing     Problem: Skin Integrity:  Goal: Skin integrity will stabilize  Description: Skin integrity will stabilize  Outcome: Ongoing     Problem: Discharge Planning:  Goal: Patients continuum of care needs are met  Description: Patients continuum of care needs are met  Outcome: Ongoing     Problem: Confusion - Acute:  Goal: Absence of continued neurological deterioration signs and symptoms  Description: Absence of continued neurological deterioration signs and symptoms  Outcome: Ongoing  Goal: Mental status will be restored to baseline  Description: Mental status will be restored to baseline  Outcome: Ongoing     Problem: Discharge Planning:  Goal: Ability to perform activities of daily living will improve  Description: Ability to perform activities of daily living will improve  Outcome: Ongoing  Goal: Participates in care planning  Description: Participates in care planning  Outcome: Ongoing     Problem: Injury - Risk of, Physical Injury:  Goal: Will remain free from falls  Description: Will remain free from falls  Outcome: Ongoing  Goal: Absence of physical injury  Description: Absence of physical injury  Outcome: Ongoing     Problem: Mood - Altered:  Goal: Mood stable  Description: Mood stable  Outcome: Ongoing  Goal: Absence of abusive behavior  Description: Absence of abusive behavior  Outcome: Ongoing  Goal: Verbalizations of feeling emotionally comfortable while being cared for will increase  Description: Verbalizations of feeling emotionally comfortable while being cared for will increase  Outcome: Ongoing     Problem: Psychomotor Activity - Altered:  Goal: Absence of psychomotor disturbance signs and symptoms  Description: Absence of psychomotor disturbance signs and symptoms  Outcome: Ongoing     Problem: Sensory Perception - Impaired:  Goal: Demonstrations of improved sensory functioning will increase  Description: Demonstrations of improved sensory functioning will increase  Outcome: Ongoing  Goal: Decrease in sensory misperception frequency  Description: Decrease in sensory misperception frequency  Outcome: Ongoing  Goal: Able to refrain from responding to false sensory perceptions  Description: Able to refrain from responding to false sensory perceptions  Outcome: Ongoing  Goal: Demonstrates accurate environmental perceptions  Description: Demonstrates accurate environmental perceptions  Outcome: Ongoing  Goal: Able to distinguish between reality-based and nonreality-based thinking  Description: Able to distinguish between reality-based and nonreality-based thinking  Outcome: Ongoing  Goal: Able to interrupt nonreality-based thinking  Description: Able to interrupt nonreality-based thinking  Outcome: Ongoing     Problem: Sleep Pattern Disturbance:  Goal: Appears well-rested  Description: Appears well-rested  Outcome: Ongoing

## 2020-11-19 NOTE — CARE COORDINATION
Hospice referral noted. Patient resides in [facility] and I seeking SNF placement in that area. Patient out of service area for [facility].     Electronically signed by Samina Abraham RN on 11/19/2020 at 8:30 AM

## 2020-11-19 NOTE — CARE COORDINATION
Isaias Parrish with [address] reports pt is approved and can admit to the facility tomorrow as skilled palliative. Will require covid results prior to admit to SNF and SW requested the order via perfect serve from the attending Dr Arabella Peng.      [facility]  Ph: [phone]  Fa: [phone]

## 2020-11-20 VITALS
WEIGHT: 131 LBS | SYSTOLIC BLOOD PRESSURE: 101 MMHG | TEMPERATURE: 97.7 F | HEART RATE: 71 BPM | BODY MASS INDEX: 20.56 KG/M2 | HEIGHT: 67 IN | OXYGEN SATURATION: 92 % | DIASTOLIC BLOOD PRESSURE: 61 MMHG | RESPIRATION RATE: 15 BRPM

## 2020-11-20 PROBLEM — R91.8 MASS OF LEFT LUNG: Status: ACTIVE | Noted: 2020-11-20

## 2020-11-20 PROBLEM — J94.8 PLEURAL MASS: Status: ACTIVE | Noted: 2020-11-20

## 2020-11-20 LAB
ANION GAP SERPL CALCULATED.3IONS-SCNC: 10 MMOL/L (ref 7–19)
BUN BLDV-MCNC: 14 MG/DL (ref 8–23)
CALCIUM SERPL-MCNC: 8.7 MG/DL (ref 8.8–10.2)
CHLORIDE BLD-SCNC: 99 MMOL/L (ref 98–111)
CO2: 29 MMOL/L (ref 22–29)
CREAT SERPL-MCNC: 0.6 MG/DL (ref 0.5–0.9)
GFR AFRICAN AMERICAN: >59
GFR NON-AFRICAN AMERICAN: >60
GLUCOSE BLD-MCNC: 100 MG/DL (ref 70–99)
GLUCOSE BLD-MCNC: 92 MG/DL (ref 74–109)
HCT VFR BLD CALC: 30.7 % (ref 37–47)
HEMOGLOBIN: 9.6 G/DL (ref 12–16)
MCH RBC QN AUTO: 27.9 PG (ref 27–31)
MCHC RBC AUTO-ENTMCNC: 31.3 G/DL (ref 33–37)
MCV RBC AUTO: 89.2 FL (ref 81–99)
PDW BLD-RTO: 13.9 % (ref 11.5–14.5)
PERFORMED ON: ABNORMAL
PLATELET # BLD: 275 K/UL (ref 130–400)
PMV BLD AUTO: 9.7 FL (ref 9.4–12.3)
POTASSIUM REFLEX MAGNESIUM: 3.8 MMOL/L (ref 3.5–5)
RBC # BLD: 3.44 M/UL (ref 4.2–5.4)
SARS-COV-2, PCR: NOT DETECTED
SODIUM BLD-SCNC: 138 MMOL/L (ref 136–145)
WBC # BLD: 18.9 K/UL (ref 4.8–10.8)

## 2020-11-20 PROCEDURE — U0003 INFECTIOUS AGENT DETECTION BY NUCLEIC ACID (DNA OR RNA); SEVERE ACUTE RESPIRATORY SYNDROME CORONAVIRUS 2 (SARS-COV-2) (CORONAVIRUS DISEASE [COVID-19]), AMPLIFIED PROBE TECHNIQUE, MAKING USE OF HIGH THROUGHPUT TECHNOLOGIES AS DESCRIBED BY CMS-2020-01-R: HCPCS

## 2020-11-20 PROCEDURE — 6360000002 HC RX W HCPCS: Performed by: HOSPITALIST

## 2020-11-20 PROCEDURE — 82947 ASSAY GLUCOSE BLOOD QUANT: CPT

## 2020-11-20 PROCEDURE — 85027 COMPLETE CBC AUTOMATED: CPT

## 2020-11-20 PROCEDURE — 94640 AIRWAY INHALATION TREATMENT: CPT

## 2020-11-20 PROCEDURE — 6370000000 HC RX 637 (ALT 250 FOR IP): Performed by: INTERNAL MEDICINE

## 2020-11-20 PROCEDURE — 36415 COLL VENOUS BLD VENIPUNCTURE: CPT

## 2020-11-20 PROCEDURE — 92526 ORAL FUNCTION THERAPY: CPT

## 2020-11-20 PROCEDURE — 80048 BASIC METABOLIC PNL TOTAL CA: CPT

## 2020-11-20 PROCEDURE — 2700000000 HC OXYGEN THERAPY PER DAY

## 2020-11-20 PROCEDURE — 6370000000 HC RX 637 (ALT 250 FOR IP): Performed by: HOSPITALIST

## 2020-11-20 PROCEDURE — 2580000003 HC RX 258: Performed by: HOSPITALIST

## 2020-11-20 RX ORDER — LORAZEPAM 2 MG/ML
1 CONCENTRATE ORAL EVERY 4 HOURS PRN
Qty: 9 ML | Refills: 0 | Status: SHIPPED | OUTPATIENT
Start: 2020-11-20 | End: 2020-11-23

## 2020-11-20 RX ORDER — IPRATROPIUM BROMIDE AND ALBUTEROL SULFATE 2.5; .5 MG/3ML; MG/3ML
3 SOLUTION RESPIRATORY (INHALATION)
Qty: 360 ML | DISCHARGE
Start: 2020-11-20

## 2020-11-20 RX ORDER — MORPHINE SULFATE 10 MG/5ML
2.5 SOLUTION ORAL EVERY 4 HOURS PRN
Qty: 23.4 ML | Refills: 0 | Status: SHIPPED | OUTPATIENT
Start: 2020-11-20 | End: 2020-11-23

## 2020-11-20 RX ADMIN — IPRATROPIUM BROMIDE AND ALBUTEROL SULFATE 1 AMPULE: .5; 3 SOLUTION RESPIRATORY (INHALATION) at 06:50

## 2020-11-20 RX ADMIN — PIPERACILLIN AND TAZOBACTAM 3.38 G: 3; .375 INJECTION, POWDER, LYOPHILIZED, FOR SOLUTION INTRAVENOUS at 00:08

## 2020-11-20 RX ADMIN — IPRATROPIUM BROMIDE AND ALBUTEROL SULFATE 1 AMPULE: .5; 3 SOLUTION RESPIRATORY (INHALATION) at 10:55

## 2020-11-20 RX ADMIN — DILTIAZEM HYDROCHLORIDE 120 MG: 30 TABLET, FILM COATED ORAL at 08:01

## 2020-11-20 RX ADMIN — Medication 10 ML: at 08:01

## 2020-11-20 RX ADMIN — PIPERACILLIN AND TAZOBACTAM 3.38 G: 3; .375 INJECTION, POWDER, LYOPHILIZED, FOR SOLUTION INTRAVENOUS at 08:00

## 2020-11-20 ASSESSMENT — PAIN SCALES - GENERAL
PAINLEVEL_OUTOF10: 0
PAINLEVEL_OUTOF10: 0

## 2020-11-20 ASSESSMENT — PAIN SCALES - WONG BAKER: WONGBAKER_NUMERICALRESPONSE: 2

## 2020-11-20 NOTE — PROGRESS NOTES
Family notified of patient discharged to [address] [place] [address], [facility]. Report called to [facility] RN.   Patient will be transported by EMS via [facility]

## 2020-11-20 NOTE — DISCHARGE SUMMARY
Discharge Summary    NAME: Shayna Hartman  :  1931  MRN:  [id number]    Admit date:  2020  Discharge date:  2020    Admitting Physician:  Tommie Heart MD    Advance Directive: DNR-CC    Consults: IP CONSULT TO PHARMACY  IP CONSULT TO PULMONOLOGY  PALLIATIVE CARE EVAL  IP CONSULT TO CARDIOLOGY  IP CONSULT TO PALLIATIVE CARE  IP CONSULT TO ONCOLOGY  IP CONSULT TO [address] [place] [address]    Primary Care Physician:  Carolynn Aguilar    Discharge Diagnoses:  Principal Problem:    Sepsis ([facility] [address]  Active Problems:    Pneumonia    Palliative care patient    Pleural effusion    Hyponatremia    Normocytic anemia    Type 2 diabetes mellitus ([facility] [address]    History of stroke with residual deficit    Dementia ([facility] [address]    Hypocalcemia    Essential hypertension    Supraventricular tachycardia (HCC)    Acute respiratory failure with hypoxia (HCC)    Fatigue    Mass of left lung    Pleural mass  Resolved Problems:    * No resolved hospital problems. *      HPI:  Shayna Hartman presents to [facility] as transfer with large left plural effusion, possible PNA. Started on Azithromycin and Rocephin. ER was not able to drain the effusion. US guided thoracentesis is ordered for today am. Pulmonary service consulted. Labs pending     Pt is demented and not answering any questions    Hospital Course:  Admitted with recurrent left pleural effusion with subsequent AHRF, believed to be parapneumonic. S/p US guided thoracenteses. CT showing pleural effusion, atelectasis, adenopathy and pleura disease that is highly suspicious for a cancerous process. Pulmonology, heme/onc consulted- would not be an ideal candidate for chemotherapy and radiation due to her very poor performance status. Rec palliative care- family now wishing to pursue facility with hospice. Will discharge to [address] [place] [address] for skilled palliative services.      1111: Presented overnight from OSH ED where she was taken with shortness of breath.  Large pleural effusion on left discovered and they were unable to perform thoracentesis in ED and thus was transferred for this and for pulmonology consult. Patient demented, unable to provide history. Admitted to med/surg on levofloxacin, piperacillin/tazobactam, and vancomycin. Underwent US-guided thoracentesis. Pulmonology consulted. 11-12: Still very confused but seems to be breathing more easily now. Will need to contact family to determine her baseline mental status. 11-13: Nauseated and coughing up small amounts of thick sputum. Feels worse today. Per family, she is sharp at her baseline. Tachycardic with long runs of SVT to 180s. Transferred to PCU with diltiazem bolus and drip ordered. Cardiology consulted. Guaifenesin/dextromethorphan to thin secretions. 11-14: Oral diltiazem, wean from drip. Levofloxacin and vancomycin stopped by pulmonology. Improving, possibly home soon. 11-15: Significantly increased left pleural effusion, repeat thoracentesis done with 900 mL removed. More short of breath and lethargic today. Discussed with pulmonology, will be started on steroids. Possibility that she will need VATS. 11-16: No significant change in radiographic appearance. Pulmonology is ordering CT, may require CT surgery consult, though she is a DNR and a poor surgical candidate so unsure what their surgical offerings would be.  11-17: CT yesterday strongly suggestive of cancer. I informed the patient but she was not able to give me any indication at this time whether she wishes to pursue further investigation or treatment. Have consulted palliative care as she had previously seemed to express interest in not pursing aggressive measures. Coughing up a large amount of yellowish white sputum. 11-18: family opting to pursue facility w/ hospice. Significant Diagnostic Studies:   Xr Chest Portable    Result Date: 11/11/2020  Examination. XR CHEST PORTABLE 11/11/2020 8:01 AM History: Shortness of breath.  A single frontal portable upright view of the chest is obtained. There is no previous study for comparison. There is a left lower lung consolidation and a large pleural effusion, free and loculated, in the left chest extending into the left apex. The right lung is poorly distended. No obvious abnormality, infiltrate or consolidation. The Heart size is not evaluated in this study. The distal trachea is displaced towards the right which is probably due to a tortuous and atheromatous thoracic aorta. There is no pneumothorax. Consolidation and pleural effusion in the left chest. The right lung is unremarkable. Signed by Dr Julita Rome on 11/11/2020 3:41 PM    Us Thoracentesis    Result Date: 11/11/2020  EXAM: US THORACENTESIS -- 11/11/2020 1:12 PM HISTORY: 80 years, Female, left pleural effusion, pneumonia on chart review. COMPARISON: No existing relevant imaging studies available PROCEDURE: Risks, benefits and alternatives to the procedure were discussed with the patient. Written informed consent obtained. Time Out: Immediately prior to the procedure, a time out was performed. The patient's name, date of birth, and procedure (including laterality) to be performed were verified. Using ultrasound guidance, a site in the left posterior chest was selected and prepped in a sterile fashion. Lidocaine was used for local anesthesia. A 4 Ivorian one-step catheter was inserted into the left pleural space. 1000 mL of serosanguineous fluid was withdrawn. The catheter was then withdrawn. The fluid was sent to the laboratory for analysis. The patient tolerated the procedure well. No evidence of complication. Successful ultrasound-guided diagnostic and therapeutic left thoracentesis with drainage of 1000 mL of serous sanguinous pleural fluid.  Signed by Dr Dionna Gale on 11/11/2020 4:11 PM      Pertinent Labs:   CBC:   Recent Labs     11/18/20  0225 11/19/20  0206 11/20/20  0450   WBC 13.3* 15.2* 18.9*   HGB 9.5* 9.4* 9.6*    295 275     BMP:    Recent Labs 11/18/20  0225 11/19/20  0206 11/20/20  0450    138 138   K 3.6 3.3* 3.8   CL 99 99 99   CO2 27 28 29   BUN 14 15 14   CREATININE 0.6 0.6 0.6   GLUCOSE 159* 72* 92         Physical Exam:  Vital Signs: /61   Pulse 71   Temp 97.7 °F (36.5 °C) (Temporal)   Resp 15   Ht 5' 7\" (1.702 m)   Wt 131 lb (59.4 kg)   SpO2 92%   BMI 20.52 kg/m²   General appearance: alert and cooperative with exam  Head: NC/AT   Eyes: normal sclera  Mouth: MMM   Lungs: no increased work of breathing  Heart: regular rate and rhythm, S1, S2 normal, no murmur, click, rub or gallop  Abdomen: soft, non-tender; bowel sounds normal; no masses,  no organomegaly  Extremities: extremities normal, atraumatic, no cyanosis or edema  Neurologic: not awake or alert  Skin: no rashes, nodules    Discharge Medications:       Gridley Purpura   Home Medication Instructions [id number]    Printed on:11/20/20 1002   Medication Information                      ipratropium-albuterol (DUONEB) 0.5-2.5 (3) MG/3ML SOLN nebulizer solution  Inhale 3 mLs into the lungs every 4 hours (while awake)             LORazepam (ATIVAN) 2 MG/ML concentrated solution  Take 0.5 mLs by mouth every 4 hours as needed (anxiety) for up to 3 days. morphine 10 MG/5ML solution  Take 1.3 mLs by mouth every 4 hours as needed for Pain for up to 3 days. Diet: DIET DYSPHAGIA PUREED; Mildly Thick (Nectar)   Activity: Resume as tolerated     Disposition: Patient is medically stable and will be discharged to facility w/ hospice. Time spent on discharge > 35 minutes. Signed:   Josy Julian MD

## 2020-11-20 NOTE — PLAN OF CARE
Problem: Falls - Risk of:  Goal: Will remain free from falls  Description: Will remain free from falls  11/19/2020 2342 by Rashawn Ledesma RN  Outcome: Ongoing  11/19/2020 1716 by Hayes Curling, LPN  Outcome: Ongoing  Goal: Absence of physical injury  Description: Absence of physical injury  11/19/2020 2342 by Rashawn Ledesma RN  Outcome: Ongoing  11/19/2020 1716 by Hayes Curling, LPN  Outcome: Ongoing     Problem: Skin Integrity:  Goal: Will show no infection signs and symptoms  Description: Will show no infection signs and symptoms  11/19/2020 2342 by Rashawn Ledesma RN  Outcome: Ongoing  11/19/2020 1716 by Hayes Curling, LPN  Outcome: Ongoing  Goal: Absence of new skin breakdown  Description: Absence of new skin breakdown  11/19/2020 2342 by Rashawn Ledesma RN  Outcome: Ongoing  11/19/2020 1716 by Hayes Curling, LPN  Outcome: Ongoing     Problem: Infection:  Goal: Will remain free from infection  Description: Will remain free from infection  11/19/2020 2342 by Rashawn Ledesma RN  Outcome: Ongoing  11/19/2020 1716 by Hayes Curling, LPN  Outcome: Ongoing     Problem: Safety:  Goal: Free from accidental physical injury  Description: Free from accidental physical injury  11/19/2020 2342 by Rashawn Ledesma RN  Outcome: Ongoing  11/19/2020 1716 by Hayes Curling, LPN  Outcome: Ongoing  Goal: Free from intentional harm  Description: Free from intentional harm  11/19/2020 2342 by Rashawn Ledesma RN  Outcome: Ongoing  11/19/2020 1716 by Hayes Curling, LPN  Outcome: Ongoing     Problem: Daily Care:  Goal: Daily care needs are met  Description: Daily care needs are met  11/19/2020 2342 by Rashawn Ledesma RN  Outcome: Ongoing  11/19/2020 1716 by Hayes Curling, LPN  Outcome: Ongoing     Problem: Pain:  Goal: Patient's pain/discomfort is manageable  Description: Patient's pain/discomfort is manageable  11/19/2020 2342 by Rashawn Ledesma RN  Outcome: Ongoing  11/19/2020 1716 by Hayes Curling, LPN  Outcome: Ongoing  Goal: Pain level will decrease  Description: Pain level will decrease  11/19/2020 2342 by Fran Perales RN  Outcome: Ongoing  11/19/2020 1716 by Lissett Vega LPN  Outcome: Ongoing  Goal: Control of acute pain  Description: Control of acute pain  11/19/2020 2342 by Fran Perales RN  Outcome: Ongoing  11/19/2020 1716 by Lissett Vega LPN  Outcome: Ongoing  Goal: Control of chronic pain  Description: Control of chronic pain  11/19/2020 2342 by Fran Perales RN  Outcome: Ongoing  11/19/2020 1716 by Lissett Vega LPN  Outcome: Ongoing     Problem: Confusion - Acute:  Goal: Absence of continued neurological deterioration signs and symptoms  Description: Absence of continued neurological deterioration signs and symptoms  11/19/2020 2342 by Fran Perales RN  Outcome: Ongoing  11/19/2020 1716 by Lissett Vega LPN  Outcome: Ongoing  Goal: Mental status will be restored to baseline  Description: Mental status will be restored to baseline  11/19/2020 2342 by Fran Perales RN  Outcome: Ongoing  11/19/2020 1716 by Lissett Vega LPN  Outcome: Ongoing     Problem: Injury - Risk of, Physical Injury:  Goal: Will remain free from falls  Description: Will remain free from falls  11/19/2020 2342 by Fran Perales RN  Outcome: Ongoing  11/19/2020 1716 by Lissett Vega LPN  Outcome: Ongoing  Goal: Absence of physical injury  Description: Absence of physical injury  11/19/2020 2342 by Fran Perales RN  Outcome: Ongoing  11/19/2020 1716 by Lissett Vega LPN  Outcome: Ongoing     Problem: Mood - Altered:  Goal: Mood stable  Description: Mood stable  11/19/2020 2342 by Fran Perales RN  Outcome: Ongoing  11/19/2020 1716 by Lissett Vega LPN  Outcome: Ongoing  Goal: Absence of abusive behavior  Description: Absence of abusive behavior  11/19/2020 2342 by Fran Perales RN  Outcome: Ongoing  11/19/2020 1716 by Lissett Vega LPN  Outcome: Ongoing  Goal: Verbalizations of feeling emotionally comfortable while being cared for will increase  Description: Verbalizations of feeling emotionally comfortable while being cared for will increase  11/19/2020 2342 by Kari Morris RN  Outcome: Ongoing  11/19/2020 1716 by Ramonita Fuller LPN  Outcome: Ongoing     Problem: Psychomotor Activity - Altered:  Goal: Absence of psychomotor disturbance signs and symptoms  Description: Absence of psychomotor disturbance signs and symptoms  11/19/2020 2342 by Kari Morris RN  Outcome: Ongoing  11/19/2020 1716 by Ramonita Fuller LPN  Outcome: Ongoing     Problem: Sensory Perception - Impaired:  Goal: Demonstrations of improved sensory functioning will increase  Description: Demonstrations of improved sensory functioning will increase  11/19/2020 2342 by Kari Morris RN  Outcome: Ongoing  11/19/2020 1716 by Ramonita Fuller LPN  Outcome: Ongoing  Goal: Decrease in sensory misperception frequency  Description: Decrease in sensory misperception frequency  11/19/2020 2342 by Kari Morris RN  Outcome: Ongoing  11/19/2020 1716 by Ramonita Fuller LPN  Outcome: Ongoing  Goal: Able to refrain from responding to false sensory perceptions  Description: Able to refrain from responding to false sensory perceptions  11/19/2020 2342 by Kari Morris RN  Outcome: Ongoing  11/19/2020 1716 by Ramonita Fuller LPN  Outcome: Ongoing  Goal: Demonstrates accurate environmental perceptions  Description: Demonstrates accurate environmental perceptions  11/19/2020 2342 by Kari Morris RN  Outcome: Ongoing  11/19/2020 1716 by Ramonita Fuller LPN  Outcome: Ongoing  Goal: Able to distinguish between reality-based and nonreality-based thinking  Description: Able to distinguish between reality-based and nonreality-based thinking  11/19/2020 2342 by Kari Morris RN  Outcome: Ongoing  11/19/2020 1716 by Ramonita Fuller LPN  Outcome: Ongoing  Goal: Able to interrupt nonreality-based thinking  Description: Able to interrupt nonreality-based thinking  11/19/2020 2342 by Gerhard Prasad RN  Outcome: Ongoing  11/19/2020 1716 by Leni Nash LPN  Outcome: Ongoing     Problem: Sleep Pattern Disturbance:  Goal: Appears well-rested  Description: Appears well-rested  11/19/2020 2342 by Gerhard Prasad RN  Outcome: Ongoing  11/19/2020 1716 by Leni Nash LPN  Outcome: Ongoing

## 2020-11-20 NOTE — PROGRESS NOTES
Recommendation: Nectar thick   Recommended Form of Meds: Meds crushed in puree as able     Compensatory Swallowing Strategies  Compensatory Swallowing Strategies: Upright as possible for all oral intake;Small bites/sips;Eat/Feed slowly; Alternate solids and liquids; Remain upright for 30-45 minutes after meals     General  Chart Reviewed: Yes  Behavior/Cognition: Alert  O2 Device: Nasal Cannula   Communication Observation: (Weak voicing was noted during verbalizations.)  Follows Directions: Simple   Patient Positioning: Upright in bed  Consistencies Administered: Dysphagia Pureed (Dysphagia I); Nectar - spoon     Monitored patient's swallowing function with the following observations noted:     Oral Phase  Mastication: Puree (Patient exhibited decreased vertical jaw movement during oral prep of puree consistency presentations administered by SLP.)  Suspected Premature Bolus Loss: Puree (Oral transit of puree consistency presentations varied from 2-5 seconds in length.)  Oral Transit: Puree (Min-moderate oral cavity residue was noted post swallows; residue was slow but cleared from the mouth with additional dry swallows.)  Oral Phase - Comment: Oral transit of nectar thick liquid presentations, administered via spoon by SLP, primarily measured 1-2 seconds in length.      Pharyngeal Phase  Suspected Swallow Delay: Puree (Suspect secondary to oral transit times.)  Decreased Laryngeal Elevation: (Patient exhibited sluggish, mildly decreased laryngeal elevation for swallow airway protection.)  Pharyngeal Phase - Comment: No outward S/S penetration/aspiration was noted with any puree presentation or nectar thick liquid presentation administered during treatment session this date. Per progress note documentation, patient family has decided upon hospice services. Would recommend continuation puree consistency and nectar thick liquids for comfort feeds. TOTAL FEED. Recommend meds crushed in pudding/applesauce.  No further

## 2020-12-15 LAB
FUNGUS (MYCOLOGY) CULTURE: NORMAL
KOH PREP: NORMAL

## 2020-12-30 LAB
AFB CULTURE (MYCOBACTERIA): NORMAL
AFB SMEAR: NORMAL